# Patient Record
Sex: MALE | Race: WHITE | NOT HISPANIC OR LATINO | Employment: OTHER | URBAN - NONMETROPOLITAN AREA
[De-identification: names, ages, dates, MRNs, and addresses within clinical notes are randomized per-mention and may not be internally consistent; named-entity substitution may affect disease eponyms.]

---

## 2018-01-12 ENCOUNTER — APPOINTMENT (OUTPATIENT)
Dept: ULTRASOUND IMAGING | Facility: HOSPITAL | Age: 38
End: 2018-01-12

## 2018-01-12 ENCOUNTER — APPOINTMENT (OUTPATIENT)
Dept: GENERAL RADIOLOGY | Facility: HOSPITAL | Age: 38
End: 2018-01-12

## 2018-01-12 ENCOUNTER — HOSPITAL ENCOUNTER (INPATIENT)
Facility: HOSPITAL | Age: 38
LOS: 2 days | Discharge: HOME | End: 2018-01-16
Attending: EMERGENCY MEDICINE | Admitting: FAMILY MEDICINE

## 2018-01-12 DIAGNOSIS — K70.31 ASCITES DUE TO ALCOHOLIC CIRRHOSIS (HCC): Primary | ICD-10-CM

## 2018-01-12 DIAGNOSIS — R26.89 IMPAIRED GAIT AND MOBILITY: ICD-10-CM

## 2018-01-12 DIAGNOSIS — R06.02 SHORTNESS OF BREATH: ICD-10-CM

## 2018-01-12 DIAGNOSIS — R26.9 IMPAIRED GAIT: ICD-10-CM

## 2018-01-12 LAB
ALBUMIN FLD-MCNC: 1.2 G/DL
ALBUMIN SERPL-MCNC: 3.1 G/DL (ref 3.4–4.8)
ALBUMIN/GLOB SERPL: 1.1 G/DL (ref 1.1–1.8)
ALP SERPL-CCNC: 91 U/L (ref 38–126)
ALT SERPL W P-5'-P-CCNC: 26 U/L (ref 21–72)
AMMONIA BLD-SCNC: <9 UMOL/L (ref 9–30)
ANION GAP SERPL CALCULATED.3IONS-SCNC: 12 MMOL/L (ref 5–15)
ANION GAP SERPL CALCULATED.3IONS-SCNC: 5 MMOL/L (ref 5–15)
APTT PPP: 38.3 SECONDS (ref 20–40.3)
AST SERPL-CCNC: 30 U/L (ref 17–59)
BASOPHILS # BLD AUTO: 0.02 10*3/MM3 (ref 0–0.2)
BASOPHILS # BLD AUTO: 0.02 10*3/MM3 (ref 0–0.2)
BASOPHILS NFR BLD AUTO: 0.6 % (ref 0–2)
BASOPHILS NFR BLD AUTO: 0.6 % (ref 0–2)
BILIRUB SERPL-MCNC: 0.8 MG/DL (ref 0.2–1.3)
BILIRUB UR QL STRIP: NEGATIVE
BUN BLD-MCNC: 8 MG/DL (ref 7–21)
BUN BLD-MCNC: 8 MG/DL (ref 7–21)
BUN/CREAT SERPL: 8.6 (ref 7–25)
BUN/CREAT SERPL: 8.7 (ref 7–25)
CALCIUM SPEC-SCNC: 8 MG/DL (ref 8.4–10.2)
CALCIUM SPEC-SCNC: 8.5 MG/DL (ref 8.4–10.2)
CHLORIDE SERPL-SCNC: 104 MMOL/L (ref 95–110)
CHLORIDE SERPL-SCNC: 106 MMOL/L (ref 95–110)
CLARITY UR: CLEAR
CO2 SERPL-SCNC: 21 MMOL/L (ref 22–31)
CO2 SERPL-SCNC: 25 MMOL/L (ref 22–31)
COLOR UR: YELLOW
CREAT BLD-MCNC: 0.92 MG/DL (ref 0.7–1.3)
CREAT BLD-MCNC: 0.93 MG/DL (ref 0.7–1.3)
DEPRECATED RDW RBC AUTO: 48.1 FL (ref 35.1–43.9)
DEPRECATED RDW RBC AUTO: 48.4 FL (ref 35.1–43.9)
EOSINOPHIL # BLD AUTO: 0.03 10*3/MM3 (ref 0–0.7)
EOSINOPHIL # BLD AUTO: 0.04 10*3/MM3 (ref 0–0.7)
EOSINOPHIL NFR BLD AUTO: 0.8 % (ref 0–7)
EOSINOPHIL NFR BLD AUTO: 1.1 % (ref 0–7)
ERYTHROCYTE [DISTWIDTH] IN BLOOD BY AUTOMATED COUNT: 14.6 % (ref 11.5–14.5)
ERYTHROCYTE [DISTWIDTH] IN BLOOD BY AUTOMATED COUNT: 14.7 % (ref 11.5–14.5)
GFR SERPL CREATININE-BSD FRML MDRD: 91 ML/MIN/1.73 (ref 70–162)
GFR SERPL CREATININE-BSD FRML MDRD: 93 ML/MIN/1.73 (ref 60–162)
GLOBULIN UR ELPH-MCNC: 2.9 GM/DL (ref 2.3–3.5)
GLUCOSE BLD-MCNC: 86 MG/DL (ref 60–100)
GLUCOSE BLD-MCNC: 88 MG/DL (ref 60–100)
GLUCOSE FLD-MCNC: 97 MG/DL
GLUCOSE UR STRIP-MCNC: NEGATIVE MG/DL
HCT VFR BLD AUTO: 28.1 % (ref 39–49)
HCT VFR BLD AUTO: 28.6 % (ref 39–49)
HGB BLD-MCNC: 9.8 G/DL (ref 13.7–17.3)
HGB BLD-MCNC: 9.9 G/DL (ref 13.7–17.3)
HGB UR QL STRIP.AUTO: NEGATIVE
HOLD SPECIMEN: NORMAL
IMM GRANULOCYTES # BLD: 0 10*3/MM3 (ref 0–0.02)
IMM GRANULOCYTES # BLD: 0.01 10*3/MM3 (ref 0–0.02)
IMM GRANULOCYTES NFR BLD: 0 % (ref 0–0.5)
IMM GRANULOCYTES NFR BLD: 0.3 % (ref 0–0.5)
INR PPP: 1.3 (ref 0.8–1.2)
KETONES UR QL STRIP: NEGATIVE
LEUKOCYTE ESTERASE UR QL STRIP.AUTO: NEGATIVE
LIPASE SERPL-CCNC: <10 U/L (ref 23–300)
LYMPHOCYTES # BLD AUTO: 1 10*3/MM3 (ref 0.6–4.2)
LYMPHOCYTES # BLD AUTO: 1.4 10*3/MM3 (ref 0.6–4.2)
LYMPHOCYTES NFR BLD AUTO: 27.7 % (ref 10–50)
LYMPHOCYTES NFR BLD AUTO: 39.9 % (ref 10–50)
MCH RBC QN AUTO: 30.9 PG (ref 26.5–34)
MCH RBC QN AUTO: 31.4 PG (ref 26.5–34)
MCHC RBC AUTO-ENTMCNC: 34.6 G/DL (ref 31.5–36.3)
MCHC RBC AUTO-ENTMCNC: 34.9 G/DL (ref 31.5–36.3)
MCV RBC AUTO: 89.4 FL (ref 80–98)
MCV RBC AUTO: 90.1 FL (ref 80–98)
MONOCYTES # BLD AUTO: 0.3 10*3/MM3 (ref 0–0.9)
MONOCYTES # BLD AUTO: 0.32 10*3/MM3 (ref 0–0.9)
MONOCYTES NFR BLD AUTO: 8.3 % (ref 0–12)
MONOCYTES NFR BLD AUTO: 9.1 % (ref 0–12)
NEUTROPHILS # BLD AUTO: 1.73 10*3/MM3 (ref 2–8.6)
NEUTROPHILS # BLD AUTO: 2.25 10*3/MM3 (ref 2–8.6)
NEUTROPHILS NFR BLD AUTO: 49.3 % (ref 37–80)
NEUTROPHILS NFR BLD AUTO: 62.3 % (ref 37–80)
NITRITE UR QL STRIP: NEGATIVE
PH UR STRIP.AUTO: 6 [PH] (ref 5–9)
PLATELET # BLD AUTO: 126 10*3/MM3 (ref 150–450)
PLATELET # BLD AUTO: 127 10*3/MM3 (ref 150–450)
PMV BLD AUTO: 10.1 FL (ref 8–12)
PMV BLD AUTO: 9.7 FL (ref 8–12)
POTASSIUM BLD-SCNC: 3.4 MMOL/L (ref 3.5–5.1)
POTASSIUM BLD-SCNC: 3.9 MMOL/L (ref 3.5–5.1)
PROT SERPL-MCNC: 6 G/DL (ref 6.3–8.6)
PROT UR QL STRIP: NEGATIVE
PROTHROMBIN TIME: 16.2 SECONDS (ref 11.1–15.3)
RBC # BLD AUTO: 3.12 10*6/MM3 (ref 4.37–5.74)
RBC # BLD AUTO: 3.2 10*6/MM3 (ref 4.37–5.74)
SODIUM BLD-SCNC: 136 MMOL/L (ref 137–145)
SODIUM BLD-SCNC: 137 MMOL/L (ref 137–145)
SP GR UR STRIP: 1.02 (ref 1–1.03)
UROBILINOGEN UR QL STRIP: NORMAL
WBC NRBC COR # BLD: 3.51 10*3/MM3 (ref 3.2–9.8)
WBC NRBC COR # BLD: 3.61 10*3/MM3 (ref 3.2–9.8)

## 2018-01-12 PROCEDURE — 76942 ECHO GUIDE FOR BIOPSY: CPT

## 2018-01-12 PROCEDURE — P9041 ALBUMIN (HUMAN),5%, 50ML: HCPCS | Performed by: FAMILY MEDICINE

## 2018-01-12 PROCEDURE — 82945 GLUCOSE OTHER FLUID: CPT | Performed by: HOSPITALIST

## 2018-01-12 PROCEDURE — 25010000002 FUROSEMIDE PER 20 MG: Performed by: FAMILY MEDICINE

## 2018-01-12 PROCEDURE — 88112 CYTOPATH CELL ENHANCE TECH: CPT | Performed by: PATHOLOGY

## 2018-01-12 PROCEDURE — 25010000002 ONDANSETRON PER 1 MG: Performed by: FAMILY MEDICINE

## 2018-01-12 PROCEDURE — G0378 HOSPITAL OBSERVATION PER HR: HCPCS

## 2018-01-12 PROCEDURE — 82042 OTHER SOURCE ALBUMIN QUAN EA: CPT | Performed by: HOSPITALIST

## 2018-01-12 PROCEDURE — 88104 CYTOPATH FL NONGYN SMEARS: CPT | Performed by: PATHOLOGY

## 2018-01-12 PROCEDURE — 81003 URINALYSIS AUTO W/O SCOPE: CPT | Performed by: EMERGENCY MEDICINE

## 2018-01-12 PROCEDURE — 99284 EMERGENCY DEPT VISIT MOD MDM: CPT

## 2018-01-12 PROCEDURE — 85730 THROMBOPLASTIN TIME PARTIAL: CPT | Performed by: EMERGENCY MEDICINE

## 2018-01-12 PROCEDURE — 85025 COMPLETE CBC W/AUTO DIFF WBC: CPT | Performed by: EMERGENCY MEDICINE

## 2018-01-12 PROCEDURE — 85025 COMPLETE CBC W/AUTO DIFF WBC: CPT | Performed by: HOSPITALIST

## 2018-01-12 PROCEDURE — 83690 ASSAY OF LIPASE: CPT | Performed by: EMERGENCY MEDICINE

## 2018-01-12 PROCEDURE — 88112 CYTOPATH CELL ENHANCE TECH: CPT | Performed by: HOSPITALIST

## 2018-01-12 PROCEDURE — 88104 CYTOPATH FL NONGYN SMEARS: CPT | Performed by: HOSPITALIST

## 2018-01-12 PROCEDURE — 87070 CULTURE OTHR SPECIMN AEROBIC: CPT | Performed by: HOSPITALIST

## 2018-01-12 PROCEDURE — 25010000002 HYDROMORPHONE PER 4 MG: Performed by: HOSPITALIST

## 2018-01-12 PROCEDURE — 25010000002 LORAZEPAM PER 2 MG: Performed by: EMERGENCY MEDICINE

## 2018-01-12 PROCEDURE — 80053 COMPREHEN METABOLIC PANEL: CPT | Performed by: EMERGENCY MEDICINE

## 2018-01-12 PROCEDURE — 71045 X-RAY EXAM CHEST 1 VIEW: CPT

## 2018-01-12 PROCEDURE — 0W9G3ZX DRAINAGE OF PERITONEAL CAVITY, PERCUTANEOUS APPROACH, DIAGNOSTIC: ICD-10-PCS | Performed by: RADIOLOGY

## 2018-01-12 PROCEDURE — 82140 ASSAY OF AMMONIA: CPT | Performed by: EMERGENCY MEDICINE

## 2018-01-12 PROCEDURE — 87015 SPECIMEN INFECT AGNT CONCNTJ: CPT | Performed by: HOSPITALIST

## 2018-01-12 PROCEDURE — 94799 UNLISTED PULMONARY SVC/PX: CPT

## 2018-01-12 PROCEDURE — 25010000002 ALBUMIN HUMAN 5% PER 50 ML: Performed by: FAMILY MEDICINE

## 2018-01-12 PROCEDURE — 87205 SMEAR GRAM STAIN: CPT | Performed by: HOSPITALIST

## 2018-01-12 PROCEDURE — 85610 PROTHROMBIN TIME: CPT | Performed by: EMERGENCY MEDICINE

## 2018-01-12 PROCEDURE — 25010000002 MORPHINE PER 10 MG: Performed by: EMERGENCY MEDICINE

## 2018-01-12 RX ORDER — ALBUMIN, HUMAN INJ 5% 5 %
25 SOLUTION INTRAVENOUS ONCE
Status: COMPLETED | OUTPATIENT
Start: 2018-01-12 | End: 2018-01-12

## 2018-01-12 RX ORDER — LACTULOSE 10 G/15ML
20 SOLUTION ORAL 2 TIMES DAILY
Status: DISCONTINUED | OUTPATIENT
Start: 2018-01-12 | End: 2018-01-16 | Stop reason: HOSPADM

## 2018-01-12 RX ORDER — ONDANSETRON 2 MG/ML
4 INJECTION INTRAMUSCULAR; INTRAVENOUS EVERY 4 HOURS
Status: DISCONTINUED | OUTPATIENT
Start: 2018-01-12 | End: 2018-01-16 | Stop reason: HOSPADM

## 2018-01-12 RX ORDER — QUETIAPINE FUMARATE 100 MG/1
200 TABLET, FILM COATED ORAL EVERY 12 HOURS SCHEDULED
Status: DISCONTINUED | OUTPATIENT
Start: 2018-01-12 | End: 2018-01-16 | Stop reason: HOSPADM

## 2018-01-12 RX ORDER — LACTULOSE 10 G/15ML
20 SOLUTION ORAL 2 TIMES DAILY
COMMUNITY
Start: 2017-12-11

## 2018-01-12 RX ORDER — NICOTINE 21 MG/24HR
1 PATCH, TRANSDERMAL 24 HOURS TRANSDERMAL EVERY 24 HOURS
Status: DISCONTINUED | OUTPATIENT
Start: 2018-01-12 | End: 2018-01-16 | Stop reason: HOSPADM

## 2018-01-12 RX ORDER — SPIRONOLACTONE 25 MG/1
25 TABLET ORAL DAILY
Status: DISCONTINUED | OUTPATIENT
Start: 2018-01-12 | End: 2018-01-16 | Stop reason: HOSPADM

## 2018-01-12 RX ORDER — LORAZEPAM 2 MG/ML
1 INJECTION INTRAMUSCULAR ONCE
Status: COMPLETED | OUTPATIENT
Start: 2018-01-12 | End: 2018-01-12

## 2018-01-12 RX ORDER — FUROSEMIDE 10 MG/ML
20 INJECTION INTRAMUSCULAR; INTRAVENOUS EVERY 12 HOURS
Status: DISCONTINUED | OUTPATIENT
Start: 2018-01-12 | End: 2018-01-14

## 2018-01-12 RX ORDER — NADOLOL 20 MG/1
20 TABLET ORAL
Status: DISCONTINUED | OUTPATIENT
Start: 2018-01-12 | End: 2018-01-16 | Stop reason: HOSPADM

## 2018-01-12 RX ORDER — SODIUM CHLORIDE 0.9 % (FLUSH) 0.9 %
10 SYRINGE (ML) INJECTION AS NEEDED
Status: DISCONTINUED | OUTPATIENT
Start: 2018-01-12 | End: 2018-01-16 | Stop reason: HOSPADM

## 2018-01-12 RX ORDER — SODIUM CHLORIDE 0.9 % (FLUSH) 0.9 %
1-10 SYRINGE (ML) INJECTION AS NEEDED
Status: DISCONTINUED | OUTPATIENT
Start: 2018-01-12 | End: 2018-01-16 | Stop reason: HOSPADM

## 2018-01-12 RX ORDER — NALOXONE HCL 0.4 MG/ML
0.4 VIAL (ML) INJECTION
Status: DISCONTINUED | OUTPATIENT
Start: 2018-01-12 | End: 2018-01-13

## 2018-01-12 RX ORDER — QUETIAPINE 400 MG/1
400 TABLET, FILM COATED, EXTENDED RELEASE ORAL NIGHTLY
COMMUNITY
End: 2018-02-20 | Stop reason: ALTCHOICE

## 2018-01-12 RX ADMIN — LORAZEPAM 1 MG: 2 INJECTION INTRAMUSCULAR; INTRAVENOUS at 01:39

## 2018-01-12 RX ADMIN — HYDROMORPHONE HYDROCHLORIDE 1 MG: 10 INJECTION, SOLUTION INTRAMUSCULAR; INTRAVENOUS; SUBCUTANEOUS at 05:20

## 2018-01-12 RX ADMIN — HYDROMORPHONE HYDROCHLORIDE 1 MG: 10 INJECTION, SOLUTION INTRAMUSCULAR; INTRAVENOUS; SUBCUTANEOUS at 17:13

## 2018-01-12 RX ADMIN — LACTULOSE 20 G: 20 SOLUTION ORAL at 20:21

## 2018-01-12 RX ADMIN — QUETIAPINE 200 MG: 100 TABLET ORAL at 20:21

## 2018-01-12 RX ADMIN — HYDROMORPHONE HYDROCHLORIDE 1 MG: 10 INJECTION, SOLUTION INTRAMUSCULAR; INTRAVENOUS; SUBCUTANEOUS at 21:35

## 2018-01-12 RX ADMIN — HYDROMORPHONE HYDROCHLORIDE 1 MG: 10 INJECTION, SOLUTION INTRAMUSCULAR; INTRAVENOUS; SUBCUTANEOUS at 09:27

## 2018-01-12 RX ADMIN — HYDROMORPHONE HYDROCHLORIDE 1 MG: 10 INJECTION, SOLUTION INTRAMUSCULAR; INTRAVENOUS; SUBCUTANEOUS at 13:09

## 2018-01-12 RX ADMIN — ALBUMIN HUMAN 25 G: 0.05 INJECTION, SOLUTION INTRAVENOUS at 13:09

## 2018-01-12 RX ADMIN — NICOTINE 1 PATCH: 21 PATCH TRANSDERMAL at 06:23

## 2018-01-12 RX ADMIN — ONDANSETRON 4 MG: 2 INJECTION INTRAMUSCULAR; INTRAVENOUS at 17:13

## 2018-01-12 RX ADMIN — NADOLOL 20 MG: 20 TABLET ORAL at 15:07

## 2018-01-12 RX ADMIN — FUROSEMIDE 20 MG: 10 INJECTION, SOLUTION INTRAMUSCULAR; INTRAVENOUS at 13:09

## 2018-01-12 RX ADMIN — MORPHINE SULFATE 4 MG: 4 INJECTION, SOLUTION INTRAMUSCULAR; INTRAVENOUS at 01:29

## 2018-01-12 RX ADMIN — ONDANSETRON 4 MG: 2 INJECTION INTRAMUSCULAR; INTRAVENOUS at 13:09

## 2018-01-12 RX ADMIN — HYDROMORPHONE HYDROCHLORIDE 1 MG: 10 INJECTION, SOLUTION INTRAMUSCULAR; INTRAVENOUS; SUBCUTANEOUS at 02:21

## 2018-01-12 RX ADMIN — SPIRONOLACTONE 25 MG: 25 TABLET ORAL at 15:07

## 2018-01-12 RX ADMIN — ONDANSETRON 4 MG: 2 INJECTION INTRAMUSCULAR; INTRAVENOUS at 21:30

## 2018-01-12 RX ADMIN — QUETIAPINE 200 MG: 100 TABLET ORAL at 09:27

## 2018-01-12 RX ADMIN — LACTULOSE 20 G: 20 SOLUTION ORAL at 11:14

## 2018-01-12 RX ADMIN — ONDANSETRON 4 MG: 2 INJECTION INTRAMUSCULAR; INTRAVENOUS at 09:27

## 2018-01-12 NOTE — PLAN OF CARE
Problem: Patient Care Overview (Adult)  Goal: Plan of Care Review  Outcome: Ongoing (interventions implemented as appropriate)   01/12/18 0430 01/12/18 0657   Patient Care Overview   Progress --  no change   Outcome Evaluation   Outcome Summary/Follow up Plan --  Patient complains of severe abdominal pain. PRN pain medications given and effective. Patient 's abdomen is extremely distended. Skin is very tight and abdominal veins are very visible. Patient has a difficult time changing position and sitting upright. Patient is currently NPO and scheduled for a paracentesis today. Will continue to monitor.    Coping/Psychosocial Response Interventions   Plan Of Care Reviewed With patient --      Goal: Adult Individualization and Mutuality  Outcome: Ongoing (interventions implemented as appropriate)    Goal: Discharge Needs Assessment  Outcome: Ongoing (interventions implemented as appropriate)      Problem: Pain, Acute (Adult)  Goal: Identify Related Risk Factors and Signs and Symptoms  Outcome: Ongoing (interventions implemented as appropriate)    Goal: Acceptable Pain Control/Comfort Level  Outcome: Ongoing (interventions implemented as appropriate)      Problem: Fluid Volume Excess (Adult,Obstetrics,Pediatric)  Goal: Identify Related Risk Factors and Signs and Symptoms  Outcome: Ongoing (interventions implemented as appropriate)    Goal: Stable Weight  Outcome: Ongoing (interventions implemented as appropriate)    Goal: Balanced Intake/Output  Outcome: Ongoing (interventions implemented as appropriate)

## 2018-01-12 NOTE — ED PROVIDER NOTES
Subjective   History of Present Illness  37-year-old male with history of alcoholic cirrhosis and ascites presents to the emergency department because of increasing abdominal distention with associated shortness of breath.  He reportedly had approximate 7 L of fluid drained from his abdominal cavity at the beginning of December.  Since then he has not followed up with GI reportedly is being scheduled appointment with GI specialist.  He's had increasing abdominal distention the point where he is having significant amount of shortness of breath prompted his visit to the emergency department tonight.  He has no fevers or chills.  His vomiting.  He reports normal urinary output.  He is a poor historian overall was not sure of his medications.  Review of Systems   Constitutional: Negative for chills, fatigue and fever.   HENT: Negative for congestion, nosebleeds, postnasal drip, sinus pressure and sore throat.    Eyes: Negative for pain.   Respiratory: Positive for shortness of breath. Negative for cough, chest tightness, wheezing and stridor.    Cardiovascular: Positive for leg swelling. Negative for chest pain and palpitations.   Gastrointestinal: Positive for abdominal distention. Negative for abdominal pain, constipation, diarrhea, nausea and vomiting.   Genitourinary: Negative for dysuria, flank pain, frequency, hematuria, testicular pain and urgency.   Musculoskeletal: Negative for arthralgias and myalgias.   Skin: Negative for rash.   Neurological: Negative for syncope, light-headedness and headaches.   Psychiatric/Behavioral: Negative.        Past Medical History:   Diagnosis Date   • Cirrhosis    • Renal disorder        No Known Allergies    Past Surgical History:   Procedure Laterality Date   • ORTHOPEDIC SURGERY Left     pt states titanium plates in L arm and R leg       History reviewed. No pertinent family history.    Social History     Social History   • Marital status: Single     Spouse name: N/A   •  Number of children: N/A   • Years of education: N/A     Social History Main Topics   • Smoking status: Current Every Day Smoker     Packs/day: 1.00     Types: Cigarettes   • Smokeless tobacco: Never Used   • Alcohol use No   • Drug use: No   • Sexual activity: Defer     Other Topics Concern   • None     Social History Narrative   • None           Objective   Physical Exam   Constitutional: He is oriented to person, place, and time. He appears well-developed and well-nourished.   HENT:   Head: Normocephalic and atraumatic.   Eyes: Conjunctivae and EOM are normal. Pupils are equal, round, and reactive to light.   Neck: Normal range of motion. Neck supple.   Cardiovascular: Normal rate, regular rhythm and normal heart sounds.    Pulmonary/Chest: Effort normal.   Mild tachypnea   Abdominal: Soft.   Tense ascites without focal tenderness.  Abdominal wall varices   Musculoskeletal: Normal range of motion.   Neurological: He is alert and oriented to person, place, and time.   Skin: Skin is warm and dry.   Psychiatric: He has a normal mood and affect.   Nursing note and vitals reviewed.      Procedures         ED Course  ED Course      XR Chest 1 View   Final Result   Atelectasis and/or infiltrate in the right lower   lung. Recommend follow-up..      Electronically signed by:  Kobe Easley MD  1/12/2018 2:13 AM   CST Workstation: [facility] Paracentesis    (Results Pending)     Labs Reviewed   COMPREHENSIVE METABOLIC PANEL - Abnormal; Notable for the following:        Result Value    Potassium 3.4 (*)     CO2 21.0 (*)     Calcium 8.0 (*)     Total Protein 6.0 (*)     Albumin 3.10 (*)     All other components within normal limits   PROTIME-INR - Abnormal; Notable for the following:     Protime 16.2 (*)     INR 1.30 (*)     All other components within normal limits    Narrative:     Therapeutic range for most indications is 2.0-3.0 INR,  or 2.5-3.5 for mechanical heart valves.   LIPASE - Abnormal; Notable for  the following:     Lipase <10 (*)     All other components within normal limits   CBC WITH AUTO DIFFERENTIAL - Abnormal; Notable for the following:     RBC 3.20 (*)     Hemoglobin 9.9 (*)     Hematocrit 28.6 (*)     RDW 14.6 (*)     RDW-SD 48.1 (*)     Platelets 127 (*)     All other components within normal limits   AMMONIA - Abnormal; Notable for the following:     Ammonia <9 (*)     All other components within normal limits   APTT - Normal    Narrative:     The recommended Heparin therapeutic range is 68-97 seconds.   URINALYSIS W/ CULTURE IF INDICATED - Normal    Narrative:     Urine microscopic not indicated.   BASIC METABOLIC PANEL   BASIC METABOLIC PANEL   CBC WITH AUTO DIFFERENTIAL   CBC WITH AUTO DIFFERENTIAL   CBC AND DIFFERENTIAL    Narrative:     The following orders were created for panel order CBC & Differential.  Procedure                               Abnormality         Status                     ---------                               -----------         ------                     CBC Auto Differential[[id number]]        Abnormal            Final result                 Please view results for these tests on the individual orders.   EXTRA TUBES    Narrative:     The following orders were created for panel order Extra Tubes.  Procedure                               Abnormality         Status                     ---------                               -----------         ------                     Gold Top - SST[[id number]]                                   Final result                 Please view results for these tests on the individual orders.   GOLD TOP - SST   CBC AND DIFFERENTIAL    Narrative:     The following orders were created for panel order CBC & Differential.  Procedure                               Abnormality         Status                     ---------                               -----------         ------                     CBC Auto Differential[[id number]]                                                          Please view results for these tests on the individual orders.   CBC AND DIFFERENTIAL    Narrative:     The following orders were created for panel order CBC & Differential.  Procedure                               Abnormality         Status                     ---------                               -----------         ------                     CBC Auto Differential[[id number]]                                                         Please view results for these tests on the individual orders.                 MDM  Number of Diagnoses or Management Options  Diagnosis management comments: I expect this patient likely be admitted because this tends ascites.  He has no fevers and I do not believe the patient has SBP at this time.  We'll check his renal function and some of the lab work.  As stated likely admit this patient.      Final diagnoses:   Ascites due to alcoholic cirrhosis   Shortness of breath            Clovis Carr MD  01/12/18 6203

## 2018-01-12 NOTE — H&P
[facility] Medicine Admission      Date of Admission: 1/12/2018      Primary Care Physician: Walt Costello Jr, MD      Chief Complaint:  Abdominal pain    HPI:  Patient has a history of cirrhosis that was diagnosed in May of 2017.  He was treated at [facility] and required transfer to [place] for higher level of care.  While in [place], he underwent hemodialysis for hepatorenal syndrome.  He improved and went to SNF for rehab.  He went home from SNF in November.  He states that on December 18 he had paracentesis with 7 liters of fluid removed.  He feels that all of the fluid has reaccumulated.  He also feels swelling in his legs.  He is currently having significant pain in his abdomen.  He is somewhat short of breath.  He states that he is completely sober, and hasn't had any alcohol in over a year.  He states that he didn't feel like all of the fluid was removed initially.  There are no alleviating or exacerbating factors.                                                                           Concurrent Medical History:  has a past medical history of Cirrhosis and Renal disorder.    Past Surgical History:  has a past surgical history that includes orthopedic surgery (Left).    Family History: Mom had an MI, Dad has COPD and HTN.    Social History:  reports that he has been smoking Cigarettes.  He has been smoking about 1.00 pack per day. He has never used smokeless tobacco. He reports that he does not drink alcohol or use illicit drugs.    Allergies: No Known Allergies    Medications:   Prescriptions Prior to Admission   Medication Sig Dispense Refill Last Dose   • lactulose (CHRONULAC) 10 GM/15ML solution Take 20 g by mouth 2 (Two) Times a Day.      • QUEtiapine XR (SEROquel XR) 400 MG 24 hr tablet Take 400 mg by mouth Every Night.          Review of Systems:  Review of Systems   Constitutional: Positive for activity change, appetite change and fever.  Negative for chills, fatigue and unexpected weight change.   HENT: Negative for congestion, facial swelling, hearing loss, nosebleeds, rhinorrhea, sneezing, trouble swallowing and voice change.    Eyes: Negative for photophobia and visual disturbance.   Respiratory: Positive for shortness of breath. Negative for apnea, cough, choking, chest tightness, wheezing and stridor.    Cardiovascular: Positive for leg swelling. Negative for chest pain and palpitations.   Gastrointestinal: Positive for abdominal distention, abdominal pain and constipation. Negative for blood in stool, diarrhea, nausea and vomiting.   Endocrine: Negative for cold intolerance, heat intolerance, polydipsia, polyphagia and polyuria.   Genitourinary: Negative for dysuria, flank pain and hematuria.   Musculoskeletal: Negative for arthralgias, back pain, myalgias and neck pain.   Skin: Negative for rash and wound.   Allergic/Immunologic: Negative for immunocompromised state.   Neurological: Negative for dizziness, seizures, syncope, speech difficulty, weakness, light-headedness, numbness and headaches.   Hematological: Does not bruise/bleed easily.   Psychiatric/Behavioral: Negative for agitation, behavioral problems, confusion, decreased concentration, hallucinations, self-injury and suicidal ideas. The patient is not nervous/anxious.       Otherwise complete ROS is negative except as mentioned above.    Physical Exam:   Temp:  [98.6 °F (37 °C)-99.2 °F (37.3 °C)] 98.8 °F (37.1 °C)  Heart Rate:  [] 87  Resp:  [18-20] 18  BP: (124-146)/(70-97) 124/70     Physical Exam   Constitutional: He is oriented to person, place, and time. He appears well-developed and well-nourished.   HENT:   Head: Normocephalic and atraumatic.   Nose: Nose normal.   Mouth/Throat: Oropharynx is clear and moist.   Eyes: Conjunctivae, EOM and lids are normal. Pupils are equal, round, and reactive to light. No scleral icterus.   Neck: Normal range of motion. Neck supple. No  JVD present. No tracheal tenderness and no spinous process tenderness present. No rigidity. No tracheal deviation, no edema and normal range of motion present.   Cardiovascular: Normal rate, regular rhythm, S1 normal, S2 normal, normal heart sounds and normal pulses.  Exam reveals no gallop and no friction rub.    No murmur heard.  Pulmonary/Chest: Effort normal and breath sounds normal. No accessory muscle usage. No respiratory distress. He has no decreased breath sounds. He has no wheezes. He has no rales. He exhibits no tenderness.   Abdominal: Soft. He exhibits distension (massive), fluid wave and ascites. He exhibits no mass. Bowel sounds are decreased. There is generalized tenderness. There is no rebound and no guarding.   Musculoskeletal: He exhibits edema. He exhibits no tenderness.   Neurological: He is alert and oriented to person, place, and time. He has normal reflexes. He displays no atrophy and normal reflexes. No cranial nerve deficit or sensory deficit. He exhibits normal muscle tone. He displays no seizure activity. Coordination normal.   Skin: Skin is warm. No rash noted. No pallor.   Psychiatric: He has a normal mood and affect. His behavior is normal. Judgment and thought content normal.         Results Reviewed:  I have personally reviewed current lab, radiology, and data and agree with results.  Lab Results (last 24 hours)     Procedure Component Value Units Date/Time    CBC & Differential [[id number]] Collected:  01/12/18 0129    Specimen:  Blood Updated:  01/12/18 0139    Narrative:       The following orders were created for panel order CBC & Differential.  Procedure                               Abnormality         Status                     ---------                               -----------         ------                     CBC Auto Differential[[id number]]        Abnormal            Final result                 Please view results for these tests on the individual orders.    CBC Auto  Differential [[id number]]  (Abnormal) Collected:  01/12/18 0129    Specimen:  Blood Updated:  01/12/18 0139     WBC 3.61 10*3/mm3      RBC 3.20 (L) 10*6/mm3      Hemoglobin 9.9 (L) g/dL      Hematocrit 28.6 (L) %      MCV 89.4 fL      MCH 30.9 pg      MCHC 34.6 g/dL      RDW 14.6 (H) %      RDW-SD 48.1 (H) fl      MPV 9.7 fL      Platelets 127 (L) 10*3/mm3      Neutrophil % 62.3 %      Lymphocyte % 27.7 %      Monocyte % 8.3 %      Eosinophil % 0.8 %      Basophil % 0.6 %      Immature Grans % 0.3 %      Neutrophils, Absolute 2.25 10*3/mm3      Lymphocytes, Absolute 1.00 10*3/mm3      Monocytes, Absolute 0.30 10*3/mm3      Eosinophils, Absolute 0.03 10*3/mm3      Basophils, Absolute 0.02 10*3/mm3      Immature Grans, Absolute 0.01 10*3/mm3     Urinalysis With / Culture If Indicated - Urine, Clean Catch [[id number]]  (Normal) Collected:  01/12/18 0140    Specimen:  Urine from Urine, Clean Catch Updated:  01/12/18 0153     Color, UA Yellow     Appearance, UA Clear     pH, UA 6.0     Specific Gravity, UA 1.017     Glucose, UA Negative     Ketones, UA Negative     Bilirubin, UA Negative     Blood, UA Negative     Protein, UA Negative     Leuk Esterase, UA Negative     Nitrite, UA Negative     Urobilinogen, UA 1.0 E.U./dL    Narrative:       Urine microscopic not indicated.    Comprehensive Metabolic Panel [[id number]]  (Abnormal) Collected:  01/12/18 0129    Specimen:  Blood Updated:  01/12/18 0155     Glucose 86 mg/dL      BUN 8 mg/dL      Creatinine 0.92 mg/dL      Sodium 137 mmol/L      Potassium 3.4 (L) mmol/L      Chloride 104 mmol/L      CO2 21.0 (L) mmol/L      Calcium 8.0 (L) mg/dL      Total Protein 6.0 (L) g/dL      Albumin 3.10 (L) g/dL      ALT (SGPT) 26 U/L      AST (SGOT) 30 U/L      Alkaline Phosphatase 91 U/L      Total Bilirubin 0.8 mg/dL      eGFR Non African Amer 93 mL/min/1.73      Globulin 2.9 gm/dL      A/G Ratio 1.1 g/dL      BUN/Creatinine Ratio 8.7     Anion Gap 12.0 mmol/L     Protime-INR  [[id number]]  (Abnormal) Collected:  01/12/18 0129    Specimen:  Blood Updated:  01/12/18 0156     Protime 16.2 (H) Seconds      INR 1.30 (H)    Narrative:       Therapeutic range for most indications is 2.0-3.0 INR,  or 2.5-3.5 for mechanical heart valves.    aPTT [[id number]]  (Normal) Collected:  01/12/18 0129    Specimen:  Blood Updated:  01/12/18 0157     PTT 38.3 seconds     Narrative:       The recommended Heparin therapeutic range is 68-97 seconds.    Lipase [[id number]]  (Abnormal) Collected:  01/12/18 0129    Specimen:  Blood Updated:  01/12/18 0213     Lipase <10 (L) U/L     Ammonia [[id number]]  (Abnormal) Collected:  01/12/18 0216    Specimen:  Blood Updated:  01/12/18 0230     Ammonia <9 (L) umol/L     Extra Tubes [[id number]] Collected:  01/12/18 0129    Specimen:  Blood from Blood, Venous Line Updated:  01/12/18 0231    Narrative:       The following orders were created for panel order Extra Tubes.  Procedure                               Abnormality         Status                     ---------                               -----------         ------                     Gold Top - SST[[id number]]                                   Final result                 Please view results for these tests on the individual orders.    Gold Top - SST [[id number]] Collected:  01/12/18 0129    Specimen:  Blood Updated:  01/12/18 0231     Extra Tube Hold for add-ons.      Auto resulted.       CBC & Differential [[id number]] Collected:  01/12/18 0700    Specimen:  Blood Updated:  01/12/18 0734    Narrative:       The following orders were created for panel order CBC & Differential.  Procedure                               Abnormality         Status                     ---------                               -----------         ------                     CBC Auto Differential[[id number]]        Abnormal            Final result                 Please view results for these tests on the individual orders.    CBC Auto Differential  [[id number]]  (Abnormal) Collected:  01/12/18 0700    Specimen:  Blood Updated:  01/12/18 0734     WBC 3.51 10*3/mm3      RBC 3.12 (L) 10*6/mm3      Hemoglobin 9.8 (L) g/dL      Hematocrit 28.1 (L) %      MCV 90.1 fL      MCH 31.4 pg      MCHC 34.9 g/dL      RDW 14.7 (H) %      RDW-SD 48.4 (H) fl      MPV 10.1 fL      Platelets 126 (L) 10*3/mm3      Neutrophil % 49.3 %      Lymphocyte % 39.9 %      Monocyte % 9.1 %      Eosinophil % 1.1 %      Basophil % 0.6 %      Immature Grans % 0.0 %      Neutrophils, Absolute 1.73 (L) 10*3/mm3      Lymphocytes, Absolute 1.40 10*3/mm3      Monocytes, Absolute 0.32 10*3/mm3      Eosinophils, Absolute 0.04 10*3/mm3      Basophils, Absolute 0.02 10*3/mm3      Immature Grans, Absolute 0.00 10*3/mm3     Basic Metabolic Panel [[id number]]  (Abnormal) Collected:  01/12/18 0700    Specimen:  Blood Updated:  01/12/18 0739     Glucose 88 mg/dL      BUN 8 mg/dL      Creatinine 0.93 mg/dL      Sodium 136 (L) mmol/L      Potassium 3.9 mmol/L      Chloride 106 mmol/L      CO2 25.0 mmol/L      Calcium 8.5 mg/dL      eGFR Non African Amer 91 mL/min/1.73      BUN/Creatinine Ratio 8.6     Anion Gap 5.0 mmol/L         Imaging Results (last 24 hours)     Procedure Component Value Units Date/Time    XR Chest 1 View [[id number]] Collected:  01/12/18 0149     Updated:  01/12/18 0214    Narrative:       Exam: AP portable chest    INDICATION: Shortness of breath    FINDINGS: AP portable chest. There is a fixation plate in the  left humerus. The lungs are hypoinflated which accentuates heart  size and bronchovascular markings. Heart size normal. There is  atelectasis and/or infiltrate in the right lower lung. Mild  atelectasis in left lower lung.      Impression:       Atelectasis and/or infiltrate in the right lower  lung. Recommend follow-up..    Electronically signed by:  Kobe Easley MD  1/12/2018 2:13 AM  CST Workstation: [id number]            Assessment:    Hospital Problem List     Ascites due  to alcoholic cirrhosis                Plan:  1.  Massive ascites:  Therapeutic paracentesis.  Will check labs on fluid, but no stigmata of infection.  2.  Cirrhosis:  It seems he is only on lactulose at home, and doesn't follow up with GI.  Medications need to be adjusted and he needs to follow with GI on discharge.  3.  Tobacco abuse:  Cessation counseling done.  4.  DVT Prophylaxis:  SCDs.    I discussed the patients findings and my recommendations with: patient        This document has been electronically signed by Aamir Dooley MD on January 12, 2018 9:34 AM

## 2018-01-12 NOTE — PROGRESS NOTES
Patient seen and examined today. Patient feels slightly better since admission earlier today and after the paracentesis    Physical exam:    Temp:  [98.6 °F (37 °C)-99.2 °F (37.3 °C)] 98.8 °F (37.1 °C)  Heart Rate:  [] 87  Resp:  [18-20] 18  BP: (124-146)/(70-97) 124/70    GA: AAO x 3 ,NAD  CVS: S1S2 RRR  Pulm: CTAB , no wheezes rales or ronchi  Abd: firm and distended , non tender  Ext: No cyanosis clubbing . B/l edema     Ascites s/sp Therapeutic paracentesis. Alcoholic cirrhosis .  Advance diet .Continue current plan of care.add lasix, aldactone, and nadolol .repeat CXR

## 2018-01-12 NOTE — PRE-PROCEDURE NOTE
Patient's history and physical exam were reviewed, and no changes were noted.  The risks and benefits of the procedure were discussed with the patient, and the patient had ample opportunity to ask questions.  Informed consent was obtained.

## 2018-01-13 LAB
ALBUMIN SERPL-MCNC: 2.5 G/DL (ref 3.4–4.8)
ALBUMIN/GLOB SERPL: 1 G/DL (ref 1.1–1.8)
ALP SERPL-CCNC: 76 U/L (ref 38–126)
ALT SERPL W P-5'-P-CCNC: 26 U/L (ref 21–72)
ANION GAP SERPL CALCULATED.3IONS-SCNC: 7 MMOL/L (ref 5–15)
ANION GAP SERPL CALCULATED.3IONS-SCNC: 8 MMOL/L (ref 5–15)
AST SERPL-CCNC: 28 U/L (ref 17–59)
BASOPHILS # BLD AUTO: 0.02 10*3/MM3 (ref 0–0.2)
BASOPHILS NFR BLD AUTO: 0.6 % (ref 0–2)
BILIRUB CONJ SERPL-MCNC: 0 MG/DL (ref 0–0.3)
BILIRUB INDIRECT SERPL-MCNC: 0.2 MG/DL (ref 0–1.1)
BILIRUB SERPL-MCNC: 0.5 MG/DL (ref 0.2–1.3)
BILIRUB SERPL-MCNC: 0.5 MG/DL (ref 0.2–1.3)
BUN BLD-MCNC: 7 MG/DL (ref 7–21)
BUN BLD-MCNC: 8 MG/DL (ref 7–21)
BUN/CREAT SERPL: 5.7 (ref 7–25)
BUN/CREAT SERPL: 6.7 (ref 7–25)
CALCIUM SPEC-SCNC: 8 MG/DL (ref 8.4–10.2)
CALCIUM SPEC-SCNC: 8.1 MG/DL (ref 8.4–10.2)
CHLORIDE SERPL-SCNC: 104 MMOL/L (ref 95–110)
CHLORIDE SERPL-SCNC: 104 MMOL/L (ref 95–110)
CO2 SERPL-SCNC: 27 MMOL/L (ref 22–31)
CO2 SERPL-SCNC: 28 MMOL/L (ref 22–31)
CREAT BLD-MCNC: 1.19 MG/DL (ref 0.7–1.3)
CREAT BLD-MCNC: 1.22 MG/DL (ref 0.7–1.3)
DEPRECATED RDW RBC AUTO: 47.6 FL (ref 35.1–43.9)
EOSINOPHIL # BLD AUTO: 0.06 10*3/MM3 (ref 0–0.7)
EOSINOPHIL NFR BLD AUTO: 1.8 % (ref 0–7)
ERYTHROCYTE [DISTWIDTH] IN BLOOD BY AUTOMATED COUNT: 14.4 % (ref 11.5–14.5)
GFR SERPL CREATININE-BSD FRML MDRD: 67 ML/MIN/1.73 (ref 60–162)
GFR SERPL CREATININE-BSD FRML MDRD: 69 ML/MIN/1.73 (ref 60–162)
GLOBULIN UR ELPH-MCNC: 2.6 GM/DL (ref 2.3–3.5)
GLUCOSE BLD-MCNC: 107 MG/DL (ref 60–100)
GLUCOSE BLD-MCNC: 99 MG/DL (ref 60–100)
HCT VFR BLD AUTO: 31.6 % (ref 39–49)
HGB BLD-MCNC: 10.8 G/DL (ref 13.7–17.3)
IMM GRANULOCYTES # BLD: 0 10*3/MM3 (ref 0–0.02)
IMM GRANULOCYTES NFR BLD: 0 % (ref 0–0.5)
LYMPHOCYTES # BLD AUTO: 1.45 10*3/MM3 (ref 0.6–4.2)
LYMPHOCYTES NFR BLD AUTO: 43 % (ref 10–50)
MCH RBC QN AUTO: 30.8 PG (ref 26.5–34)
MCHC RBC AUTO-ENTMCNC: 34.2 G/DL (ref 31.5–36.3)
MCV RBC AUTO: 90 FL (ref 80–98)
MONOCYTES # BLD AUTO: 0.28 10*3/MM3 (ref 0–0.9)
MONOCYTES NFR BLD AUTO: 8.3 % (ref 0–12)
NEUTROPHILS # BLD AUTO: 1.56 10*3/MM3 (ref 2–8.6)
NEUTROPHILS NFR BLD AUTO: 46.3 % (ref 37–80)
PLATELET # BLD AUTO: 148 10*3/MM3 (ref 150–450)
PMV BLD AUTO: 10.9 FL (ref 8–12)
POTASSIUM BLD-SCNC: 3.8 MMOL/L (ref 3.5–5.1)
POTASSIUM BLD-SCNC: 3.9 MMOL/L (ref 3.5–5.1)
PROT SERPL-MCNC: 5.1 G/DL (ref 6.3–8.6)
RBC # BLD AUTO: 3.51 10*6/MM3 (ref 4.37–5.74)
SODIUM BLD-SCNC: 138 MMOL/L (ref 137–145)
SODIUM BLD-SCNC: 140 MMOL/L (ref 137–145)
WBC NRBC COR # BLD: 3.37 10*3/MM3 (ref 3.2–9.8)

## 2018-01-13 PROCEDURE — 82247 BILIRUBIN TOTAL: CPT | Performed by: FAMILY MEDICINE

## 2018-01-13 PROCEDURE — 80053 COMPREHEN METABOLIC PANEL: CPT | Performed by: HOSPITALIST

## 2018-01-13 PROCEDURE — 85025 COMPLETE CBC W/AUTO DIFF WBC: CPT | Performed by: HOSPITALIST

## 2018-01-13 PROCEDURE — 82248 BILIRUBIN DIRECT: CPT | Performed by: FAMILY MEDICINE

## 2018-01-13 PROCEDURE — 25010000002 ALBUMIN HUMAN 5% PER 50 ML: Performed by: FAMILY MEDICINE

## 2018-01-13 PROCEDURE — 25010000002 FUROSEMIDE PER 20 MG: Performed by: FAMILY MEDICINE

## 2018-01-13 PROCEDURE — 25010000002 ONDANSETRON PER 1 MG: Performed by: FAMILY MEDICINE

## 2018-01-13 PROCEDURE — P9041 ALBUMIN (HUMAN),5%, 50ML: HCPCS | Performed by: FAMILY MEDICINE

## 2018-01-13 PROCEDURE — G0378 HOSPITAL OBSERVATION PER HR: HCPCS

## 2018-01-13 RX ORDER — HYDROXYZINE PAMOATE 25 MG/1
25 CAPSULE ORAL ONCE
Status: COMPLETED | OUTPATIENT
Start: 2018-01-13 | End: 2018-01-13

## 2018-01-13 RX ORDER — ALBUMIN, HUMAN INJ 5% 5 %
25 SOLUTION INTRAVENOUS ONCE
Status: COMPLETED | OUTPATIENT
Start: 2018-01-13 | End: 2018-01-13

## 2018-01-13 RX ORDER — OXYCODONE HCL 10 MG/1
10 TABLET, FILM COATED, EXTENDED RELEASE ORAL EVERY 8 HOURS PRN
Status: DISCONTINUED | OUTPATIENT
Start: 2018-01-13 | End: 2018-01-16 | Stop reason: HOSPADM

## 2018-01-13 RX ORDER — FUROSEMIDE 10 MG/ML
20 INJECTION INTRAMUSCULAR; INTRAVENOUS ONCE
Status: COMPLETED | OUTPATIENT
Start: 2018-01-13 | End: 2018-01-13

## 2018-01-13 RX ORDER — OXYCODONE HCL 10 MG/1
10 TABLET, FILM COATED, EXTENDED RELEASE ORAL EVERY 12 HOURS SCHEDULED
Status: DISCONTINUED | OUTPATIENT
Start: 2018-01-13 | End: 2018-01-13

## 2018-01-13 RX ADMIN — ONDANSETRON 4 MG: 2 INJECTION INTRAMUSCULAR; INTRAVENOUS at 05:20

## 2018-01-13 RX ADMIN — NICOTINE 1 PATCH: 21 PATCH TRANSDERMAL at 05:21

## 2018-01-13 RX ADMIN — QUETIAPINE 200 MG: 100 TABLET ORAL at 21:20

## 2018-01-13 RX ADMIN — LACTULOSE 20 G: 20 SOLUTION ORAL at 08:17

## 2018-01-13 RX ADMIN — HYDROMORPHONE HYDROCHLORIDE 1 MG: 10 INJECTION, SOLUTION INTRAMUSCULAR; INTRAVENOUS; SUBCUTANEOUS at 10:24

## 2018-01-13 RX ADMIN — ONDANSETRON 4 MG: 2 INJECTION INTRAMUSCULAR; INTRAVENOUS at 01:30

## 2018-01-13 RX ADMIN — SPIRONOLACTONE 25 MG: 25 TABLET ORAL at 08:17

## 2018-01-13 RX ADMIN — ONDANSETRON 4 MG: 2 INJECTION INTRAMUSCULAR; INTRAVENOUS at 18:30

## 2018-01-13 RX ADMIN — ONDANSETRON 4 MG: 2 INJECTION INTRAMUSCULAR; INTRAVENOUS at 21:20

## 2018-01-13 RX ADMIN — ONDANSETRON 4 MG: 2 INJECTION INTRAMUSCULAR; INTRAVENOUS at 08:18

## 2018-01-13 RX ADMIN — FUROSEMIDE 20 MG: 10 INJECTION, SOLUTION INTRAMUSCULAR; INTRAVENOUS at 00:10

## 2018-01-13 RX ADMIN — HYDROMORPHONE HYDROCHLORIDE 1 MG: 10 INJECTION, SOLUTION INTRAMUSCULAR; INTRAVENOUS; SUBCUTANEOUS at 05:20

## 2018-01-13 RX ADMIN — QUETIAPINE 200 MG: 100 TABLET ORAL at 08:17

## 2018-01-13 RX ADMIN — FUROSEMIDE 20 MG: 10 INJECTION, SOLUTION INTRAMUSCULAR; INTRAVENOUS at 12:59

## 2018-01-13 RX ADMIN — OXYCODONE HYDROCHLORIDE 10 MG: 10 TABLET, FILM COATED, EXTENDED RELEASE ORAL at 18:30

## 2018-01-13 RX ADMIN — LACTULOSE 20 G: 20 SOLUTION ORAL at 21:20

## 2018-01-13 RX ADMIN — OXYCODONE HYDROCHLORIDE 10 MG: 10 TABLET, FILM COATED, EXTENDED RELEASE ORAL at 14:46

## 2018-01-13 RX ADMIN — NADOLOL 20 MG: 20 TABLET ORAL at 08:17

## 2018-01-13 RX ADMIN — HYDROXYZINE PAMOATE 25 MG: 25 CAPSULE ORAL at 00:30

## 2018-01-13 RX ADMIN — FUROSEMIDE 20 MG: 10 INJECTION, SOLUTION INTRAMUSCULAR; INTRAVENOUS at 13:01

## 2018-01-13 RX ADMIN — ONDANSETRON 4 MG: 2 INJECTION INTRAMUSCULAR; INTRAVENOUS at 13:01

## 2018-01-13 RX ADMIN — ALBUMIN HUMAN 25 G: 0.05 INJECTION, SOLUTION INTRAVENOUS at 19:18

## 2018-01-13 NOTE — PROGRESS NOTES
[facility] Medicine Services  INPATIENT PROGRESS NOTE    Length of Stay: 0  Date of Admission: 1/12/2018  Primary Care Physician: Walt Costello Jr, MD    Subjective     Chief Complaint   Patient presents with   • Abdominal Pain       HPI:  Patient was seen and examined this morning. Patient states his dyspnea resolved , and his abd discomfort and nausea has significantly improved.   Patient is tolerating diet , passing gas, Patient denies fever or chills, headache or dizziness, chest pain or palpitations, difficulty in breathing or cough, abdominal pain V/D , blood in the urine or blood in the stool , or any urinary symptoms , weakness or numbness or tingling in the extremities or visual changes.    Review of Systems     All pertinent negatives and positives are as above. All other systems have been reviewed and are negative unless otherwise stated.   Objective    Physical exam    Temp:  [98.5 °F (36.9 °C)-99.4 °F (37.4 °C)] 99.2 °F (37.3 °C)  Heart Rate:  [] 89  Resp:  [18] 18  BP: (104-137)/(62-94) 104/64    -Constitutional: Patient is AAO x 3. Patient appears well-developed and well-nourished.   -CVS: Normal rate, regular rhythm, normal heart sounds and intact distal pulses.  Exam reveals no gallop and no friction rub.  No murmur heard.  -Pulm: Effort normal and breath sounds normal. No respiratory distress. No wheezes, rales or ronchi.  -Abdominal: distended, non tender  ( improving )  -Musculoskeletal: No Cyanosis clubbing . B/l edema ( improved )  Results Review:    Laboratory Data:     Results from last 7 days  Lab Units 01/13/18  1050 01/13/18  0434 01/12/18  0700 01/12/18  0129   SODIUM mmol/L 138 140 136* 137   POTASSIUM mmol/L 3.8 3.9 3.9 3.4*   CHLORIDE mmol/L 104 104 106 104   CO2 mmol/L 27.0 28.0 25.0 21.0*   BUN mg/dL 8 7 8 8   CREATININE mg/dL 1.19 1.22 0.93 0.92   GLUCOSE mg/dL 99 107* 88 86   CALCIUM mg/dL 8.0* 8.1* 8.5 8.0*   BILIRUBIN mg/dL 0.5   0.5  --   --  0.8   ALK PHOS U/L 76  --   --  91   ALT (SGPT) U/L 26  --   --  26   AST (SGOT) U/L 28  --   --  30   ANION GAP mmol/L 7.0 8.0 5.0 12.0     Estimated Creatinine Clearance: 100.7 mL/min (by C-G formula based on Cr of 1.19).            Results from last 7 days  Lab Units 01/13/18  0434 01/12/18  0700 01/12/18  0129   WBC 10*3/mm3 3.37 3.51 3.61   HEMOGLOBIN g/dL 10.8* 9.8* 9.9*   HEMATOCRIT % 31.6* 28.1* 28.6*   PLATELETS 10*3/mm3 148* 126* 127*       Results from last 7 days  Lab Units 01/12/18  0129   INR  1.30*       Culture Data:   No results found for: BLOODCX  No results found for: URINECX  No results found for: RESPCX  No results found for: WOUNDCX  No results found for: STOOLCX  No components found for: BODYFLD    Micobiology                            Radiology Data:   Imaging Results (all)     Procedure Component Value Units Date/Time    XR Chest 1 View [[id number]] Collected:  01/12/18 0149     Updated:  01/12/18 0214    Narrative:       Exam: AP portable chest    INDICATION: Shortness of breath    FINDINGS: AP portable chest. There is a fixation plate in the  left humerus. The lungs are hypoinflated which accentuates heart  size and bronchovascular markings. Heart size normal. There is  atelectasis and/or infiltrate in the right lower lung. Mild  atelectasis in left lower lung.      Impression:       Atelectasis and/or infiltrate in the right lower  lung. Recommend follow-up..    Electronically signed by:  Kobe Easley MD  1/12/2018 2:13 AM  CST Workstation: [id number]     Paracentesis [[id number]] Collected:  01/12/18 1011    Specimen:  Body Fluid Updated:  01/12/18 1144    Narrative:         PROCEDURE: Ultrasound-guided paracentesis    COMPARISON: None    HISTORY: Ascites    TECHNIQUE:  The risks, benefits and alternatives were explained to the  patient who had ample opportunity to ask questions. The patient  agreed to proceed and informed consent was obtained.    An adequate fluid  pocket was identified in the left lower  quadrant. The site was marked then prepped and draped in sterile  fashion. Approximately 5 mL of 2% lidocaine solution was used for  local superficial and deep anesthesia. A 5 Chinese Yueh needle was  inserted into the peritoneal cavity under sonographic guidance.    FINDINGS:   Approximately 10.8 L of clear, serous fluid was removed. There  were no immediate post procedure complications.      Impression:       CONCLUSION:    Successful ultrasound-guided paracentesis, as described above.    Electronically signed by:  Adelfo Escobar MD  1/12/2018 11:28 AM  CST Workstation: [id number]    XR Chest 1 View [[id number]] Collected:  01/12/18 1142     Updated:  01/12/18 1210    Narrative:         PROCEDURE: Single chest view AP    REASON FOR EXAM:dyspnea, K70.31 Alcoholic cirrhosis of liver with  ascites R06.02 Shortness of breath    FINDINGS: Comparison exam dated January 12, 2018. Cardiac and  pulmonary vasculature are normal. Improvement in right lung base  patchy opacity with improved aeration in this region. Otherwise  lungs are clear. Very small right pleural effusion along right  minor fissure. No acute osseous abnormality.      Impression:       1.  Improvement in right lung base patchy opacity with improved  aeration suggesting resolving atelectasis and/or pneumonia.  2.  Very small right pleural effusion along right minor fissure.    Electronically signed by:  Ravi Hughes MD  1/12/2018 12:08 PM CST  Workstation: [id number]          I have reviewed the patient current medications.   Assessment/Plan     Hospital Problem List     Ascites due to alcoholic cirrhosis          Assessment / Plan    --Massive ascites:    Therapeutic paracentesis on 1/12/18 , 10.8 L removed  Symptoms improving.  no stigmata of infection, labs on fluid pending  lasix, aldactone, and nadolol .repeat CXR improved   Will consider another therapeutic paracentesis on Monday    --Cirrhosis:   Likely secondary to  alcohol use  As above.  Patient has johan scheduled with Dr Sandoval as outpatient to be established as new patient     --Tobacco abuse:   Cessation counseling done.    --DVT Prophylaxis:    SCDs.    This document has been electronically signed by Chuck Barnett MD on January 13, 2018 12:22 PM

## 2018-01-13 NOTE — PLAN OF CARE
Problem: Patient Care Overview (Adult)  Goal: Plan of Care Review  Outcome: Ongoing (interventions implemented as appropriate)   01/13/18 0159   Patient Care Overview   Progress no change   Outcome Evaluation   Outcome Summary/Follow up Plan Paracentesis performed today. Pt much more comfortable at this time. VSS. No new complaints.   Coping/Psychosocial Response Interventions   Plan Of Care Reviewed With patient

## 2018-01-14 LAB
ANION GAP SERPL CALCULATED.3IONS-SCNC: 8 MMOL/L (ref 5–15)
BASOPHILS # BLD AUTO: 0.01 10*3/MM3 (ref 0–0.2)
BASOPHILS NFR BLD AUTO: 0.3 % (ref 0–2)
BUN BLD-MCNC: 11 MG/DL (ref 7–21)
BUN/CREAT SERPL: 8.3 (ref 7–25)
CALCIUM SPEC-SCNC: 8.1 MG/DL (ref 8.4–10.2)
CHLORIDE SERPL-SCNC: 102 MMOL/L (ref 95–110)
CO2 SERPL-SCNC: 28 MMOL/L (ref 22–31)
CREAT BLD-MCNC: 1.32 MG/DL (ref 0.7–1.3)
DEPRECATED RDW RBC AUTO: 47.1 FL (ref 35.1–43.9)
EOSINOPHIL # BLD AUTO: 0.07 10*3/MM3 (ref 0–0.7)
EOSINOPHIL NFR BLD AUTO: 2 % (ref 0–7)
ERYTHROCYTE [DISTWIDTH] IN BLOOD BY AUTOMATED COUNT: 14.3 % (ref 11.5–14.5)
GFR SERPL CREATININE-BSD FRML MDRD: 61 ML/MIN/1.73 (ref 60–162)
GLUCOSE BLD-MCNC: 101 MG/DL (ref 60–100)
HCT VFR BLD AUTO: 31.2 % (ref 39–49)
HGB BLD-MCNC: 10.6 G/DL (ref 13.7–17.3)
IMM GRANULOCYTES # BLD: 0 10*3/MM3 (ref 0–0.02)
IMM GRANULOCYTES NFR BLD: 0 % (ref 0–0.5)
LYMPHOCYTES # BLD AUTO: 1.44 10*3/MM3 (ref 0.6–4.2)
LYMPHOCYTES NFR BLD AUTO: 41.6 % (ref 10–50)
MCH RBC QN AUTO: 30.5 PG (ref 26.5–34)
MCHC RBC AUTO-ENTMCNC: 34 G/DL (ref 31.5–36.3)
MCV RBC AUTO: 89.9 FL (ref 80–98)
MONOCYTES # BLD AUTO: 0.27 10*3/MM3 (ref 0–0.9)
MONOCYTES NFR BLD AUTO: 7.8 % (ref 0–12)
NEUTROPHILS # BLD AUTO: 1.67 10*3/MM3 (ref 2–8.6)
NEUTROPHILS NFR BLD AUTO: 48.3 % (ref 37–80)
PLATELET # BLD AUTO: 142 10*3/MM3 (ref 150–450)
PMV BLD AUTO: 10.6 FL (ref 8–12)
POTASSIUM BLD-SCNC: 3.8 MMOL/L (ref 3.5–5.1)
RBC # BLD AUTO: 3.47 10*6/MM3 (ref 4.37–5.74)
SODIUM BLD-SCNC: 138 MMOL/L (ref 137–145)
WBC NRBC COR # BLD: 3.46 10*3/MM3 (ref 3.2–9.8)

## 2018-01-14 PROCEDURE — 25010000002 ONDANSETRON PER 1 MG: Performed by: FAMILY MEDICINE

## 2018-01-14 PROCEDURE — P9041 ALBUMIN (HUMAN),5%, 50ML: HCPCS | Performed by: FAMILY MEDICINE

## 2018-01-14 PROCEDURE — 85025 COMPLETE CBC W/AUTO DIFF WBC: CPT | Performed by: HOSPITALIST

## 2018-01-14 PROCEDURE — 25010000002 ALBUMIN HUMAN 5% PER 50 ML: Performed by: FAMILY MEDICINE

## 2018-01-14 PROCEDURE — 80048 BASIC METABOLIC PNL TOTAL CA: CPT | Performed by: HOSPITALIST

## 2018-01-14 PROCEDURE — 25010000002 FUROSEMIDE PER 20 MG: Performed by: FAMILY MEDICINE

## 2018-01-14 RX ORDER — PANTOPRAZOLE SODIUM 40 MG/1
40 TABLET, DELAYED RELEASE ORAL
Status: DISCONTINUED | OUTPATIENT
Start: 2018-01-14 | End: 2018-01-16 | Stop reason: HOSPADM

## 2018-01-14 RX ORDER — ALBUMIN, HUMAN INJ 5% 5 %
25 SOLUTION INTRAVENOUS ONCE
Status: COMPLETED | OUTPATIENT
Start: 2018-01-14 | End: 2018-01-14

## 2018-01-14 RX ADMIN — QUETIAPINE 200 MG: 100 TABLET ORAL at 20:21

## 2018-01-14 RX ADMIN — OXYCODONE HYDROCHLORIDE 10 MG: 10 TABLET, FILM COATED, EXTENDED RELEASE ORAL at 20:21

## 2018-01-14 RX ADMIN — PANTOPRAZOLE SODIUM 40 MG: 40 TABLET, DELAYED RELEASE ORAL at 17:58

## 2018-01-14 RX ADMIN — ONDANSETRON 4 MG: 2 INJECTION INTRAMUSCULAR; INTRAVENOUS at 08:38

## 2018-01-14 RX ADMIN — NICOTINE 1 PATCH: 21 PATCH TRANSDERMAL at 05:30

## 2018-01-14 RX ADMIN — OXYCODONE HYDROCHLORIDE 10 MG: 10 TABLET, FILM COATED, EXTENDED RELEASE ORAL at 11:28

## 2018-01-14 RX ADMIN — ALBUMIN HUMAN 25 G: 0.05 INJECTION, SOLUTION INTRAVENOUS at 11:19

## 2018-01-14 RX ADMIN — NADOLOL 20 MG: 20 TABLET ORAL at 08:39

## 2018-01-14 RX ADMIN — LACTULOSE 20 G: 20 SOLUTION ORAL at 08:38

## 2018-01-14 RX ADMIN — ONDANSETRON 4 MG: 2 INJECTION INTRAMUSCULAR; INTRAVENOUS at 14:30

## 2018-01-14 RX ADMIN — ONDANSETRON 4 MG: 2 INJECTION INTRAMUSCULAR; INTRAVENOUS at 05:24

## 2018-01-14 RX ADMIN — ONDANSETRON 4 MG: 2 INJECTION INTRAMUSCULAR; INTRAVENOUS at 17:59

## 2018-01-14 RX ADMIN — FUROSEMIDE 20 MG: 10 INJECTION, SOLUTION INTRAMUSCULAR; INTRAVENOUS at 00:39

## 2018-01-14 RX ADMIN — ONDANSETRON 4 MG: 2 INJECTION INTRAMUSCULAR; INTRAVENOUS at 21:46

## 2018-01-14 RX ADMIN — LACTULOSE 20 G: 20 SOLUTION ORAL at 20:21

## 2018-01-14 RX ADMIN — OXYCODONE HYDROCHLORIDE 10 MG: 10 TABLET, FILM COATED, EXTENDED RELEASE ORAL at 03:14

## 2018-01-14 RX ADMIN — SPIRONOLACTONE 25 MG: 25 TABLET ORAL at 08:39

## 2018-01-14 RX ADMIN — HYDROMORPHONE HYDROCHLORIDE 0.25 MG: 10 INJECTION, SOLUTION INTRAMUSCULAR; INTRAVENOUS; SUBCUTANEOUS at 21:57

## 2018-01-14 RX ADMIN — ONDANSETRON 4 MG: 2 INJECTION INTRAMUSCULAR; INTRAVENOUS at 00:39

## 2018-01-14 RX ADMIN — QUETIAPINE 200 MG: 100 TABLET ORAL at 08:39

## 2018-01-14 NOTE — PROGRESS NOTES
[facility] Medicine Services  INPATIENT PROGRESS NOTE    Length of Stay: 0  Date of Admission: 1/12/2018  Primary Care Physician: Walt Costello Jr, MD    Subjective     Chief Complaint   Patient presents with   • Abdominal Pain       HPI:  Patient was seen and examined this morning. Patient states his dyspnea resolved , and his abd discomfort and nausea continue to improve and tolerating diet .  Patient is tolerating diet , passing gas, Patient denies fever or chills, headache or dizziness, chest pain or palpitations, difficulty in breathing or cough, abdominal pain V/D , blood in the urine or blood in the stool , or any urinary symptoms , weakness or numbness or tingling in the extremities or visual changes.    Review of Systems     All pertinent negatives and positives are as above. All other systems have been reviewed and are negative unless otherwise stated.   Objective    Physical exam    Temp:  [97 °F (36.1 °C)-100.1 °F (37.8 °C)] 98.5 °F (36.9 °C)  Heart Rate:  [76-91] 86  Resp:  [16-18] 16  BP: ()/(57-70) 99/57    -Constitutional: Patient is AAO x 3. Patient appears well-developed and well-nourished.   -CVS: Normal rate, regular rhythm, normal heart sounds and intact distal pulses.  Exam reveals no gallop and no friction rub.  No murmur heard.  -Pulm: Effort normal and breath sounds normal. No respiratory distress. No wheezes, rales or ronchi.  -Abdominal: distended, non tender  ( improving )  -Musculoskeletal: No Cyanosis clubbing . B/l edema ( improved )  Results Review:    Laboratory Data:     Results from last 7 days  Lab Units 01/14/18  0507 01/13/18  1050 01/13/18  0434  01/12/18  0129   SODIUM mmol/L 138 138 140  < > 137   POTASSIUM mmol/L 3.8 3.8 3.9  < > 3.4*   CHLORIDE mmol/L 102 104 104  < > 104   CO2 mmol/L 28.0 27.0 28.0  < > 21.0*   BUN mg/dL 11 8 7  < > 8   CREATININE mg/dL 1.32* 1.19 1.22  < > 0.92   GLUCOSE mg/dL 101* 99 107*  < > 86   CALCIUM  mg/dL 8.1* 8.0* 8.1*  < > 8.0*   BILIRUBIN mg/dL  --  0.5  0.5  --   --  0.8   ALK PHOS U/L  --  76  --   --  91   ALT (SGPT) U/L  --  26  --   --  26   AST (SGOT) U/L  --  28  --   --  30   ANION GAP mmol/L 8.0 7.0 8.0  < > 12.0   < > = values in this interval not displayed.  Estimated Creatinine Clearance: 100.2 mL/min (by C-G formula based on Cr of 1.32).            Results from last 7 days  Lab Units 01/14/18  0507 01/13/18  0434 01/12/18  0700 01/12/18  0129   WBC 10*3/mm3 3.46 3.37 3.51 3.61   HEMOGLOBIN g/dL 10.6* 10.8* 9.8* 9.9*   HEMATOCRIT % 31.2* 31.6* 28.1* 28.6*   PLATELETS 10*3/mm3 142* 148* 126* 127*       Results from last 7 days  Lab Units 01/12/18  0129   INR  1.30*       Culture Data:   No results found for: BLOODCX  No results found for: URINECX  No results found for: RESPCX  No results found for: WOUNDCX  No results found for: STOOLCX  No components found for: BODYFLD    Micobiology                            Radiology Data:   Imaging Results (all)     Procedure Component Value Units Date/Time    XR Chest 1 View [[id number]] Collected:  01/12/18 0149     Updated:  01/12/18 0214    Narrative:       Exam: AP portable chest    INDICATION: Shortness of breath    FINDINGS: AP portable chest. There is a fixation plate in the  left humerus. The lungs are hypoinflated which accentuates heart  size and bronchovascular markings. Heart size normal. There is  atelectasis and/or infiltrate in the right lower lung. Mild  atelectasis in left lower lung.      Impression:       Atelectasis and/or infiltrate in the right lower  lung. Recommend follow-up..    Electronically signed by:  Kobe Easley MD  1/12/2018 2:13 AM  CST Workstation: [id number]     Paracentesis [[id number]] Collected:  01/12/18 1011    Specimen:  Body Fluid Updated:  01/12/18 1144    Narrative:         PROCEDURE: Ultrasound-guided paracentesis    COMPARISON: None    HISTORY: Ascites    TECHNIQUE:  The risks, benefits and alternatives  were explained to the  patient who had ample opportunity to ask questions. The patient  agreed to proceed and informed consent was obtained.    An adequate fluid pocket was identified in the left lower  quadrant. The site was marked then prepped and draped in sterile  fashion. Approximately 5 mL of 2% lidocaine solution was used for  local superficial and deep anesthesia. A 5 Nepali Yueh needle was  inserted into the peritoneal cavity under sonographic guidance.    FINDINGS:   Approximately 10.8 L of clear, serous fluid was removed. There  were no immediate post procedure complications.      Impression:       CONCLUSION:    Successful ultrasound-guided paracentesis, as described above.    Electronically signed by:  Adelfo Escobar MD  1/12/2018 11:28 AM  CST Workstation: [id number]    XR Chest 1 View [[id number]] Collected:  01/12/18 1142     Updated:  01/12/18 1210    Narrative:         PROCEDURE: Single chest view AP    REASON FOR EXAM:dyspnea, K70.31 Alcoholic cirrhosis of liver with  ascites R06.02 Shortness of breath    FINDINGS: Comparison exam dated January 12, 2018. Cardiac and  pulmonary vasculature are normal. Improvement in right lung base  patchy opacity with improved aeration in this region. Otherwise  lungs are clear. Very small right pleural effusion along right  minor fissure. No acute osseous abnormality.      Impression:       1.  Improvement in right lung base patchy opacity with improved  aeration suggesting resolving atelectasis and/or pneumonia.  2.  Very small right pleural effusion along right minor fissure.    Electronically signed by:  Ravi Hughes MD  1/12/2018 12:08 PM CST  Workstation: [id number]          I have reviewed the patient current medications.   Assessment/Plan     Hospital Problem List     Ascites due to alcoholic cirrhosis          Assessment / Plan    --Massive ascites:    Therapeutic paracentesis on 1/12/18 , 10.8 L removed  Symptoms improving.  no stigmata of infection, labs on fluid  pending  aldactone, and nadolol .repeat CXR improved   Lasix held today as Cr slightly increased  Will consider another therapeutic paracentesis on Monday     --Cirrhosis:   Likely secondary to alcohol use  As above.  Patient has johan scheduled with Dr Sandoval as outpatient to be established as new patient next week    --Tobacco abuse:   Cessation counseling done.    --DVT Prophylaxis:    SCDs.    This document has been electronically signed by Chuck Barnett MD on January 14, 2018 12:40 PM

## 2018-01-14 NOTE — PLAN OF CARE
Problem: Patient Care Overview (Adult)  Goal: Plan of Care Review  Outcome: Ongoing (interventions implemented as appropriate)   01/14/18 0300   Patient Care Overview   Progress no change   Outcome Evaluation   Outcome Summary/Follow up Plan Pt has rested most of this shft. Ordered pain meds changed today. Pt describing pain as better at this time.    Coping/Psychosocial Response Interventions   Plan Of Care Reviewed With patient

## 2018-01-15 ENCOUNTER — APPOINTMENT (OUTPATIENT)
Dept: ULTRASOUND IMAGING | Facility: HOSPITAL | Age: 38
End: 2018-01-15

## 2018-01-15 LAB
ANION GAP SERPL CALCULATED.3IONS-SCNC: 7 MMOL/L (ref 5–15)
BASOPHILS # BLD AUTO: 0.02 10*3/MM3 (ref 0–0.2)
BASOPHILS NFR BLD AUTO: 0.6 % (ref 0–2)
BUN BLD-MCNC: 11 MG/DL (ref 7–21)
BUN/CREAT SERPL: 9.2 (ref 7–25)
CALCIUM SPEC-SCNC: 8.2 MG/DL (ref 8.4–10.2)
CHLORIDE SERPL-SCNC: 103 MMOL/L (ref 95–110)
CO2 SERPL-SCNC: 26 MMOL/L (ref 22–31)
CREAT BLD-MCNC: 1.2 MG/DL (ref 0.7–1.3)
DEPRECATED RDW RBC AUTO: 47.1 FL (ref 35.1–43.9)
EOSINOPHIL # BLD AUTO: 0.06 10*3/MM3 (ref 0–0.7)
EOSINOPHIL NFR BLD AUTO: 1.8 % (ref 0–7)
ERYTHROCYTE [DISTWIDTH] IN BLOOD BY AUTOMATED COUNT: 14.3 % (ref 11.5–14.5)
GFR SERPL CREATININE-BSD FRML MDRD: 68 ML/MIN/1.73 (ref 60–162)
GLUCOSE BLD-MCNC: 92 MG/DL (ref 60–100)
HCT VFR BLD AUTO: 29.9 % (ref 39–49)
HGB BLD-MCNC: 10.5 G/DL (ref 13.7–17.3)
IMM GRANULOCYTES # BLD: 0.01 10*3/MM3 (ref 0–0.02)
IMM GRANULOCYTES NFR BLD: 0.3 % (ref 0–0.5)
LAB AP CASE REPORT: NORMAL
LAB AP DIAGNOSIS COMMENT: NORMAL
LYMPHOCYTES # BLD AUTO: 1.61 10*3/MM3 (ref 0.6–4.2)
LYMPHOCYTES NFR BLD AUTO: 49.1 % (ref 10–50)
Lab: NORMAL
MCH RBC QN AUTO: 31.3 PG (ref 26.5–34)
MCHC RBC AUTO-ENTMCNC: 35.1 G/DL (ref 31.5–36.3)
MCV RBC AUTO: 89.3 FL (ref 80–98)
MONOCYTES # BLD AUTO: 0.31 10*3/MM3 (ref 0–0.9)
MONOCYTES NFR BLD AUTO: 9.5 % (ref 0–12)
NEUTROPHILS # BLD AUTO: 1.27 10*3/MM3 (ref 2–8.6)
NEUTROPHILS NFR BLD AUTO: 38.7 % (ref 37–80)
PATH REPORT.FINAL DX SPEC: NORMAL
PLATELET # BLD AUTO: 128 10*3/MM3 (ref 150–450)
PMV BLD AUTO: 10.4 FL (ref 8–12)
POTASSIUM BLD-SCNC: 3.8 MMOL/L (ref 3.5–5.1)
RBC # BLD AUTO: 3.35 10*6/MM3 (ref 4.37–5.74)
SODIUM BLD-SCNC: 136 MMOL/L (ref 137–145)
STAT OF ADQ CVX/VAG CYTO-IMP: NORMAL
WBC NRBC COR # BLD: 3.28 10*3/MM3 (ref 3.2–9.8)

## 2018-01-15 PROCEDURE — 0W9G3ZX DRAINAGE OF PERITONEAL CAVITY, PERCUTANEOUS APPROACH, DIAGNOSTIC: ICD-10-PCS | Performed by: RADIOLOGY

## 2018-01-15 PROCEDURE — P9041 ALBUMIN (HUMAN),5%, 50ML: HCPCS | Performed by: FAMILY MEDICINE

## 2018-01-15 PROCEDURE — 25010000002 ONDANSETRON PER 1 MG: Performed by: FAMILY MEDICINE

## 2018-01-15 PROCEDURE — 80048 BASIC METABOLIC PNL TOTAL CA: CPT | Performed by: HOSPITALIST

## 2018-01-15 PROCEDURE — 25010000002 ALBUMIN HUMAN 5% PER 50 ML: Performed by: FAMILY MEDICINE

## 2018-01-15 PROCEDURE — 76942 ECHO GUIDE FOR BIOPSY: CPT

## 2018-01-15 PROCEDURE — 85025 COMPLETE CBC W/AUTO DIFF WBC: CPT | Performed by: HOSPITALIST

## 2018-01-15 RX ORDER — ALBUMIN, HUMAN INJ 5% 5 %
25 SOLUTION INTRAVENOUS ONCE
Status: COMPLETED | OUTPATIENT
Start: 2018-01-15 | End: 2018-01-15

## 2018-01-15 RX ADMIN — QUETIAPINE 200 MG: 100 TABLET ORAL at 21:00

## 2018-01-15 RX ADMIN — NICOTINE 1 PATCH: 21 PATCH TRANSDERMAL at 04:47

## 2018-01-15 RX ADMIN — OXYCODONE HYDROCHLORIDE 10 MG: 10 TABLET, FILM COATED, EXTENDED RELEASE ORAL at 11:05

## 2018-01-15 RX ADMIN — Medication 10 ML: at 15:04

## 2018-01-15 RX ADMIN — ONDANSETRON 4 MG: 2 INJECTION INTRAMUSCULAR; INTRAVENOUS at 17:47

## 2018-01-15 RX ADMIN — LACTULOSE 20 G: 20 SOLUTION ORAL at 21:01

## 2018-01-15 RX ADMIN — ALBUMIN HUMAN 25 G: 0.05 INJECTION, SOLUTION INTRAVENOUS at 11:57

## 2018-01-15 RX ADMIN — Medication 10 ML: at 17:47

## 2018-01-15 RX ADMIN — QUETIAPINE 200 MG: 100 TABLET ORAL at 08:48

## 2018-01-15 RX ADMIN — NADOLOL 20 MG: 20 TABLET ORAL at 08:48

## 2018-01-15 RX ADMIN — Medication 3 ML: at 08:48

## 2018-01-15 RX ADMIN — SPIRONOLACTONE 25 MG: 25 TABLET ORAL at 08:48

## 2018-01-15 RX ADMIN — Medication 3 ML: at 11:57

## 2018-01-15 RX ADMIN — ONDANSETRON 4 MG: 2 INJECTION INTRAMUSCULAR; INTRAVENOUS at 21:01

## 2018-01-15 RX ADMIN — PANTOPRAZOLE SODIUM 40 MG: 40 TABLET, DELAYED RELEASE ORAL at 08:48

## 2018-01-15 RX ADMIN — ONDANSETRON 4 MG: 2 INJECTION INTRAMUSCULAR; INTRAVENOUS at 15:03

## 2018-01-15 RX ADMIN — ONDANSETRON 4 MG: 2 INJECTION INTRAMUSCULAR; INTRAVENOUS at 04:47

## 2018-01-15 RX ADMIN — LACTULOSE 20 G: 20 SOLUTION ORAL at 08:47

## 2018-01-15 RX ADMIN — OXYCODONE HYDROCHLORIDE 10 MG: 10 TABLET, FILM COATED, EXTENDED RELEASE ORAL at 04:47

## 2018-01-15 RX ADMIN — ONDANSETRON 4 MG: 2 INJECTION INTRAMUSCULAR; INTRAVENOUS at 08:48

## 2018-01-15 RX ADMIN — PANTOPRAZOLE SODIUM 40 MG: 40 TABLET, DELAYED RELEASE ORAL at 17:47

## 2018-01-15 RX ADMIN — OXYCODONE HYDROCHLORIDE 10 MG: 10 TABLET, FILM COATED, EXTENDED RELEASE ORAL at 19:22

## 2018-01-15 NOTE — PROGRESS NOTES
[facility] Medicine Services  INPATIENT PROGRESS NOTE    Length of Stay: 1  Date of Admission: 1/12/2018  Primary Care Physician: Walt Costello Jr, MD    Subjective     Chief Complaint   Patient presents with   • Abdominal Pain       HPI:  Patient was seen and examined this morning. Patient states his dyspnea resolved , and his abd discomfort and nausea continue to improve and he is  tolerating diet with only mild nausea.  Patient is tolerating diet , passing gas, Patient denies fever or chills, headache or dizziness, chest pain or palpitations, difficulty in breathing or cough, abdominal pain V/D , blood in the urine or blood in the stool , or any urinary symptoms , weakness or numbness or tingling in the extremities or visual changes.    Review of Systems     All pertinent negatives and positives are as above. All other systems have been reviewed and are negative unless otherwise stated.   Objective    Physical exam    Temp:  [98 °F (36.7 °C)-98.3 °F (36.8 °C)] 98.1 °F (36.7 °C)  Heart Rate:  [83-93] 93  Resp:  [16-18] 18  BP: (112-149)/(62-73) 112/69    -Constitutional: Patient is AAO x 3. Patient appears well-developed and well-nourished.   -CVS: Normal rate, regular rhythm, normal heart sounds and intact distal pulses.  Exam reveals no gallop and no friction rub.  No murmur heard.  -Pulm: Effort normal and breath sounds normal. No respiratory distress. No wheezes, rales or ronchi.  -Abdominal: distended, non tender  ( improving )  -Musculoskeletal: No Cyanosis clubbing . B/l edema ( improved )  Results Review:    Laboratory Data:     Results from last 7 days  Lab Units 01/15/18  0519 01/14/18  0507 01/13/18  1050  01/12/18  0129   SODIUM mmol/L 136* 138 138  < > 137   POTASSIUM mmol/L 3.8 3.8 3.8  < > 3.4*   CHLORIDE mmol/L 103 102 104  < > 104   CO2 mmol/L 26.0 28.0 27.0  < > 21.0*   BUN mg/dL 11 11 8  < > 8   CREATININE mg/dL 1.20 1.32* 1.19  < > 0.92   GLUCOSE mg/dL  92 101* 99  < > 86   CALCIUM mg/dL 8.2* 8.1* 8.0*  < > 8.0*   BILIRUBIN mg/dL  --   --  0.5  0.5  --  0.8   ALK PHOS U/L  --   --  76  --  91   ALT (SGPT) U/L  --   --  26  --  26   AST (SGOT) U/L  --   --  28  --  30   ANION GAP mmol/L 7.0 8.0 7.0  < > 12.0   < > = values in this interval not displayed.  Estimated Creatinine Clearance: 111 mL/min (by C-G formula based on Cr of 1.2).            Results from last 7 days  Lab Units 01/15/18  0519 01/14/18  0507 01/13/18  0434 01/12/18  0700 01/12/18  0129   WBC 10*3/mm3 3.28 3.46 3.37 3.51 3.61   HEMOGLOBIN g/dL 10.5* 10.6* 10.8* 9.8* 9.9*   HEMATOCRIT % 29.9* 31.2* 31.6* 28.1* 28.6*   PLATELETS 10*3/mm3 128* 142* 148* 126* 127*       Results from last 7 days  Lab Units 01/12/18  0129   INR  1.30*       Culture Data:   No results found for: BLOODCX  No results found for: URINECX  No results found for: RESPCX  No results found for: WOUNDCX  No results found for: STOOLCX  No components found for: BODYFLD    Micobiology                            Radiology Data:   Imaging Results (all)     Procedure Component Value Units Date/Time    XR Chest 1 View [[id number]] Collected:  01/12/18 0149     Updated:  01/12/18 0214    Narrative:       Exam: AP portable chest    INDICATION: Shortness of breath    FINDINGS: AP portable chest. There is a fixation plate in the  left humerus. The lungs are hypoinflated which accentuates heart  size and bronchovascular markings. Heart size normal. There is  atelectasis and/or infiltrate in the right lower lung. Mild  atelectasis in left lower lung.      Impression:       Atelectasis and/or infiltrate in the right lower  lung. Recommend follow-up..    Electronically signed by:  Kobe Easley MD  1/12/2018 2:13 AM  CST Workstation: [id number]    US Paracentesis [[id number]] Collected:  01/12/18 1011    Specimen:  Body Fluid Updated:  01/12/18 1144    Narrative:         PROCEDURE: Ultrasound-guided paracentesis    COMPARISON: None    HISTORY:  Ascites    TECHNIQUE:  The risks, benefits and alternatives were explained to the  patient who had ample opportunity to ask questions. The patient  agreed to proceed and informed consent was obtained.    An adequate fluid pocket was identified in the left lower  quadrant. The site was marked then prepped and draped in sterile  fashion. Approximately 5 mL of 2% lidocaine solution was used for  local superficial and deep anesthesia. A 5 Latvian Yueh needle was  inserted into the peritoneal cavity under sonographic guidance.    FINDINGS:   Approximately 10.8 L of clear, serous fluid was removed. There  were no immediate post procedure complications.      Impression:       CONCLUSION:    Successful ultrasound-guided paracentesis, as described above.    Electronically signed by:  Adelfo Escobar MD  1/12/2018 11:28 AM  CST Workstation: [id number]    XR Chest 1 View [[id number]] Collected:  01/12/18 1142     Updated:  01/12/18 1210    Narrative:         PROCEDURE: Single chest view AP    REASON FOR EXAM:dyspnea, K70.31 Alcoholic cirrhosis of liver with  ascites R06.02 Shortness of breath    FINDINGS: Comparison exam dated January 12, 2018. Cardiac and  pulmonary vasculature are normal. Improvement in right lung base  patchy opacity with improved aeration in this region. Otherwise  lungs are clear. Very small right pleural effusion along right  minor fissure. No acute osseous abnormality.      Impression:       1.  Improvement in right lung base patchy opacity with improved  aeration suggesting resolving atelectasis and/or pneumonia.  2.  Very small right pleural effusion along right minor fissure.    Electronically signed by:  Ravi Hughes MD  1/12/2018 12:08 PM CST  Workstation: [id number]          I have reviewed the patient current medications.   Assessment/Plan     Hospital Problem List     Ascites due to alcoholic cirrhosis          Assessment / Plan    --Massive ascites:    Therapeutic paracentesis on 1/12/18 , 10.8 L  removed  Symptoms improving.  no stigmata of infection, labs on fluid pending  aldactone, and nadolol .repeat CXR improved   Lasix held  as Cr slightly increased  Cr today  Improved to 1.2  Will perform another  therapeutic paracentesis today and administer another albumin 25g    --Cirrhosis:   Likely secondary to alcohol use  As above.  Patient has johan scheduled with Dr Sandoval as outpatient to be established as new patient on 1/31/18    --Tobacco abuse:   Cessation counseling done.    --DVT Prophylaxis:    SCDs.    This document has been electronically signed by Chuck Barnett MD on January 15, 2018 11:02 AM

## 2018-01-15 NOTE — PLAN OF CARE
Problem: Patient Care Overview (Adult)  Goal: Plan of Care Review  Outcome: Ongoing (interventions implemented as appropriate)   01/15/18 0530   Patient Care Overview   Progress no change   Outcome Evaluation   Outcome Summary/Follow up Plan Pt has c/o increasing abdominal pain this shift. Continues to c/o anxiety. VSS.    Coping/Psychosocial Response Interventions   Plan Of Care Reviewed With patient

## 2018-01-15 NOTE — PROGRESS NOTES
Discharge Planning Assessment  [facility]     Patient Name: Jung Cadet  MRN: [id number]  Today's Date: 1/15/2018    Admit Date: 1/12/2018          Discharge Needs Assessment       01/15/18 133    Living Environment    Lives With parent(s)    Living Arrangements house    Type of Financial/Environmental Concern none    Transportation Available car;family or friend will provide    Living Environment    Provides Primary Care For no one    Quality Of Family Relationships supportive    Able to Return to Prior Living Arrangements yes    Living Arrangement Comments Pt resides at home with his father. pt appears to have limited family support.    Discharge Needs Assessment    Concerns To Be Addressed adjustment to diagnosis/illness concerns;substance/tobacco abuse/use concerns;compliance issue concerns    Concerns Comments Alcohol abuse    Equipment Currently Used at Home cane, straight;wheelchair    Discharge Facility/Level Of Care Needs home with home health    Current Discharge Risk chronically ill;physical impairment;substance abuse    Discharge Disposition still a patient            Discharge Plan       01/15/18 2146    Case Management/Social Work Plan    Additional Comments LSW assesment complete. pt resides at home with his father. pt appears to have limited family support. pt has long history of alcohol abuse. pt reports being independent prior to hospitalization however for past two weeks he has been more weak and SOA. pt plans to return home at d/c and is agreeable to home health follow up. LSW is awaiting additional recomendations from MD and therapy. LSW/case mgt will follow up as consulted and complete arrangements as ordered.        Discharge Placement     No information found        Expected Discharge Date and Time     Expected Discharge Date Expected Discharge Time    Jan 13, 2018               Demographic Summary       01/15/18 1322    Referral Information    Admission Type inpatient    Referral  Source high risk screening    Reason For Consult discharge planning    Record Reviewed medical record    Contact Information    Permission Granted to Share Information With     Primary Care Physician Information    Name Dr Costello            Functional Status       01/15/18 3329    Functional Status Prior    Ambulation 1-->assistive equipment    Transferring 1-->assistive equipment    Toileting 0-->independent    Bathing 0-->independent    Dressing 0-->independent    Eating 0-->independent    Communication 0-->understands/communicates without difficulty    Swallowing 0-->swallows foods/liquids without difficulty    Prior Functional Level Comment Pt informed LSW that he has been having trouble with ADL's for past two weeks due to fatigue and SOA.    IADL    Medications independent    Meal Preparation independent    Housekeeping assistive person    Laundry assistive person    Shopping assistive person    Oral Care independent    Activity Tolerance    Current Activity Limitations weakness severe, generalized    Usual Activity Tolerance poor    Current Activity Tolerance poor    Cognitive/Perceptual/Developmental    Current Mental Status/Cognitive Functioning no deficits noted    Recent Changes in Mental Status/Cognitive Functioning no changes    Developmental Stage (Eriksson's Stages of Development) Stage 7 (35-65 years/Middle Adulthood) Generativity vs. Stagnation    Employment/Financial    Source Of Income disability            Psychosocial     None            Abuse/Neglect     None            Legal     None            Substance Abuse     None            Patient Forms     None          ANNY Wilkinson

## 2018-01-16 VITALS
BODY MASS INDEX: 26.1 KG/M2 | HEIGHT: 72 IN | DIASTOLIC BLOOD PRESSURE: 70 MMHG | RESPIRATION RATE: 18 BRPM | SYSTOLIC BLOOD PRESSURE: 106 MMHG | WEIGHT: 192.7 LBS | OXYGEN SATURATION: 96 % | TEMPERATURE: 97.8 F | HEART RATE: 96 BPM

## 2018-01-16 LAB
ANION GAP SERPL CALCULATED.3IONS-SCNC: 5 MMOL/L (ref 5–15)
BASOPHILS # BLD AUTO: 0.01 10*3/MM3 (ref 0–0.2)
BASOPHILS NFR BLD AUTO: 0.3 % (ref 0–2)
BUN BLD-MCNC: 12 MG/DL (ref 7–21)
BUN/CREAT SERPL: 9.9 (ref 7–25)
CALCIUM SPEC-SCNC: 8.4 MG/DL (ref 8.4–10.2)
CHLORIDE SERPL-SCNC: 103 MMOL/L (ref 95–110)
CO2 SERPL-SCNC: 27 MMOL/L (ref 22–31)
CREAT BLD-MCNC: 1.21 MG/DL (ref 0.7–1.3)
DEPRECATED RDW RBC AUTO: 47 FL (ref 35.1–43.9)
EOSINOPHIL # BLD AUTO: 0.11 10*3/MM3 (ref 0–0.7)
EOSINOPHIL NFR BLD AUTO: 2.9 % (ref 0–7)
ERYTHROCYTE [DISTWIDTH] IN BLOOD BY AUTOMATED COUNT: 14.3 % (ref 11.5–14.5)
GFR SERPL CREATININE-BSD FRML MDRD: 67 ML/MIN/1.73 (ref 70–162)
GLUCOSE BLD-MCNC: 104 MG/DL (ref 60–100)
HCT VFR BLD AUTO: 32.3 % (ref 39–49)
HGB BLD-MCNC: 11.1 G/DL (ref 13.7–17.3)
IMM GRANULOCYTES # BLD: 0.01 10*3/MM3 (ref 0–0.02)
IMM GRANULOCYTES NFR BLD: 0.3 % (ref 0–0.5)
LYMPHOCYTES # BLD AUTO: 1.44 10*3/MM3 (ref 0.6–4.2)
LYMPHOCYTES NFR BLD AUTO: 38 % (ref 10–50)
MCH RBC QN AUTO: 30.6 PG (ref 26.5–34)
MCHC RBC AUTO-ENTMCNC: 34.4 G/DL (ref 31.5–36.3)
MCV RBC AUTO: 89 FL (ref 80–98)
MONOCYTES # BLD AUTO: 0.41 10*3/MM3 (ref 0–0.9)
MONOCYTES NFR BLD AUTO: 10.8 % (ref 0–12)
NEUTROPHILS # BLD AUTO: 1.81 10*3/MM3 (ref 2–8.6)
NEUTROPHILS NFR BLD AUTO: 47.7 % (ref 37–80)
PLATELET # BLD AUTO: 148 10*3/MM3 (ref 150–450)
PMV BLD AUTO: 10.8 FL (ref 8–12)
POTASSIUM BLD-SCNC: 4 MMOL/L (ref 3.5–5.1)
RBC # BLD AUTO: 3.63 10*6/MM3 (ref 4.37–5.74)
SODIUM BLD-SCNC: 135 MMOL/L (ref 137–145)
WBC NRBC COR # BLD: 3.79 10*3/MM3 (ref 3.2–9.8)

## 2018-01-16 PROCEDURE — 85025 COMPLETE CBC W/AUTO DIFF WBC: CPT | Performed by: HOSPITALIST

## 2018-01-16 PROCEDURE — 25010000002 ONDANSETRON PER 1 MG: Performed by: FAMILY MEDICINE

## 2018-01-16 PROCEDURE — 80048 BASIC METABOLIC PNL TOTAL CA: CPT | Performed by: HOSPITALIST

## 2018-01-16 RX ORDER — PANTOPRAZOLE SODIUM 40 MG/1
40 TABLET, DELAYED RELEASE ORAL
Qty: 30 TABLET | Refills: 0 | Status: SHIPPED | OUTPATIENT
Start: 2018-01-16 | End: 2018-01-31

## 2018-01-16 RX ORDER — SPIRONOLACTONE 25 MG/1
25 TABLET ORAL DAILY
Qty: 30 TABLET | Refills: 0 | Status: SHIPPED | OUTPATIENT
Start: 2018-01-17 | End: 2018-01-18 | Stop reason: SDUPTHER

## 2018-01-16 RX ORDER — NADOLOL 20 MG/1
20 TABLET ORAL
Qty: 30 TABLET | Refills: 0 | Status: SHIPPED | OUTPATIENT
Start: 2018-01-17 | End: 2018-02-16

## 2018-01-16 RX ORDER — FUROSEMIDE 20 MG/1
20 TABLET ORAL 2 TIMES DAILY
Qty: 60 TABLET | Refills: 0 | Status: SHIPPED | OUTPATIENT
Start: 2018-01-16 | End: 2018-01-18 | Stop reason: SDUPTHER

## 2018-01-16 RX ADMIN — NADOLOL 20 MG: 20 TABLET ORAL at 08:56

## 2018-01-16 RX ADMIN — SPIRONOLACTONE 25 MG: 25 TABLET ORAL at 08:55

## 2018-01-16 RX ADMIN — NICOTINE 1 PATCH: 21 PATCH TRANSDERMAL at 05:56

## 2018-01-16 RX ADMIN — ONDANSETRON 4 MG: 2 INJECTION INTRAMUSCULAR; INTRAVENOUS at 08:55

## 2018-01-16 RX ADMIN — QUETIAPINE 200 MG: 100 TABLET ORAL at 08:55

## 2018-01-16 RX ADMIN — LACTULOSE 20 G: 20 SOLUTION ORAL at 08:55

## 2018-01-16 RX ADMIN — OXYCODONE HYDROCHLORIDE 10 MG: 10 TABLET, FILM COATED, EXTENDED RELEASE ORAL at 11:33

## 2018-01-16 RX ADMIN — PANTOPRAZOLE SODIUM 40 MG: 40 TABLET, DELAYED RELEASE ORAL at 08:55

## 2018-01-16 RX ADMIN — ONDANSETRON 4 MG: 2 INJECTION INTRAMUSCULAR; INTRAVENOUS at 03:14

## 2018-01-16 RX ADMIN — OXYCODONE HYDROCHLORIDE 10 MG: 10 TABLET, FILM COATED, EXTENDED RELEASE ORAL at 03:14

## 2018-01-16 RX ADMIN — ONDANSETRON 4 MG: 2 INJECTION INTRAMUSCULAR; INTRAVENOUS at 05:52

## 2018-01-16 RX ADMIN — Medication 3 ML: at 08:55

## 2018-01-16 NOTE — DISCHARGE PLACEMENT REQUEST
"Jung Cadet (37 y.o. Male)     Date of Birth Social Security Number Address Home Phone MRN    1980  [address] [place] [id number] [phone] [id number]    Church Marital Status          None Single       Admission Date Admission Type Admitting Provider Attending Provider Department, Room/Bed    1/12/18 Emergency Chuck Barnett MD Williams, Kevin L, MD [address], 401/1    Discharge Date Discharge Disposition Discharge Destination         Home or Self Care             Attending Provider: Aamir Dooley MD     Allergies:  No Known Allergies    Isolation:  None   Infection:  None   Code Status:  FULL    Ht:  182.9 cm (72\")   Wt:  87.4 kg (192 lb 11.2 oz)    Admission Cmt:  None   Principal Problem:  None                Active Insurance as of 1/12/2018     Primary Coverage     Payor Plan Insurance Group Employer/Plan Group    MEDICARE MEDICARE A & B      Payor Plan Address Payor Plan Phone Number Effective From Effective To    [address] [phone] 8/1/2004     [place] [address]       Subscriber Name Subscriber Birth Date Member ID       JUNG CADET 1980 [id number]                 Emergency Contacts      (Rel.) Home Phone Work Phone Mobile Phone    Adam Cadet (Father) [phone] -- [phone]               History & Physical      Aamir Dooley MD at 1/12/2018  9:34 AM                [facility] Medicine Admission      Date of Admission: 1/12/2018      Primary Care Physician: Walt Costello Jr, MD      Chief Complaint:  Abdominal pain    HPI:  Patient has a history of cirrhosis that was diagnosed in May of 2017.  He was treated at [facility] and required transfer to [place] for higher level of care.  While in [place], he underwent hemodialysis for hepatorenal syndrome.  He improved and went to SNF for rehab.  He went home from SNF in November.  He states that on December 18 he " had paracentesis with 7 liters of fluid removed.  He feels that all of the fluid has reaccumulated.  He also feels swelling in his legs.  He is currently having significant pain in his abdomen.  He is somewhat short of breath.  He states that he is completely sober, and hasn't had any alcohol in over a year.  He states that he didn't feel like all of the fluid was removed initially.  There are no alleviating or exacerbating factors.                                                                           Concurrent Medical History:  has a past medical history of Cirrhosis and Renal disorder.    Past Surgical History:  has a past surgical history that includes orthopedic surgery (Left).    Family History: Mom had an MI, Dad has COPD and HTN.    Social History:  reports that he has been smoking Cigarettes.  He has been smoking about 1.00 pack per day. He has never used smokeless tobacco. He reports that he does not drink alcohol or use illicit drugs.    Allergies: No Known Allergies    Medications:   Prescriptions Prior to Admission   Medication Sig Dispense Refill Last Dose   • lactulose (CHRONULAC) 10 GM/15ML solution Take 20 g by mouth 2 (Two) Times a Day.      • QUEtiapine XR (SEROquel XR) 400 MG 24 hr tablet Take 400 mg by mouth Every Night.          Review of Systems:  Review of Systems   Constitutional: Positive for activity change, appetite change and fever. Negative for chills, fatigue and unexpected weight change.   HENT: Negative for congestion, facial swelling, hearing loss, nosebleeds, rhinorrhea, sneezing, trouble swallowing and voice change.    Eyes: Negative for photophobia and visual disturbance.   Respiratory: Positive for shortness of breath. Negative for apnea, cough, choking, chest tightness, wheezing and stridor.    Cardiovascular: Positive for leg swelling. Negative for chest pain and palpitations.   Gastrointestinal: Positive for abdominal distention, abdominal pain and constipation. Negative  for blood in stool, diarrhea, nausea and vomiting.   Endocrine: Negative for cold intolerance, heat intolerance, polydipsia, polyphagia and polyuria.   Genitourinary: Negative for dysuria, flank pain and hematuria.   Musculoskeletal: Negative for arthralgias, back pain, myalgias and neck pain.   Skin: Negative for rash and wound.   Allergic/Immunologic: Negative for immunocompromised state.   Neurological: Negative for dizziness, seizures, syncope, speech difficulty, weakness, light-headedness, numbness and headaches.   Hematological: Does not bruise/bleed easily.   Psychiatric/Behavioral: Negative for agitation, behavioral problems, confusion, decreased concentration, hallucinations, self-injury and suicidal ideas. The patient is not nervous/anxious.       Otherwise complete ROS is negative except as mentioned above.    Physical Exam:   Temp:  [98.6 °F (37 °C)-99.2 °F (37.3 °C)] 98.8 °F (37.1 °C)  Heart Rate:  [] 87  Resp:  [18-20] 18  BP: (124-146)/(70-97) 124/70     Physical Exam   Constitutional: He is oriented to person, place, and time. He appears well-developed and well-nourished.   HENT:   Head: Normocephalic and atraumatic.   Nose: Nose normal.   Mouth/Throat: Oropharynx is clear and moist.   Eyes: Conjunctivae, EOM and lids are normal. Pupils are equal, round, and reactive to light. No scleral icterus.   Neck: Normal range of motion. Neck supple. No JVD present. No tracheal tenderness and no spinous process tenderness present. No rigidity. No tracheal deviation, no edema and normal range of motion present.   Cardiovascular: Normal rate, regular rhythm, S1 normal, S2 normal, normal heart sounds and normal pulses.  Exam reveals no gallop and no friction rub.    No murmur heard.  Pulmonary/Chest: Effort normal and breath sounds normal. No accessory muscle usage. No respiratory distress. He has no decreased breath sounds. He has no wheezes. He has no rales. He exhibits no tenderness.   Abdominal: Soft.  He exhibits distension (massive), fluid wave and ascites. He exhibits no mass. Bowel sounds are decreased. There is generalized tenderness. There is no rebound and no guarding.   Musculoskeletal: He exhibits edema. He exhibits no tenderness.   Neurological: He is alert and oriented to person, place, and time. He has normal reflexes. He displays no atrophy and normal reflexes. No cranial nerve deficit or sensory deficit. He exhibits normal muscle tone. He displays no seizure activity. Coordination normal.   Skin: Skin is warm. No rash noted. No pallor.   Psychiatric: He has a normal mood and affect. His behavior is normal. Judgment and thought content normal.         Results Reviewed:  I have personally reviewed current lab, radiology, and data and agree with results.  Lab Results (last 24 hours)     Procedure Component Value Units Date/Time    CBC & Differential [[id number]] Collected:  01/12/18 0129    Specimen:  Blood Updated:  01/12/18 0139    Narrative:       The following orders were created for panel order CBC & Differential.  Procedure                               Abnormality         Status                     ---------                               -----------         ------                     CBC Auto Differential[[id number]]        Abnormal            Final result                 Please view results for these tests on the individual orders.    CBC Auto Differential [[id number]]  (Abnormal) Collected:  01/12/18 0129    Specimen:  Blood Updated:  01/12/18 0139     WBC 3.61 10*3/mm3      RBC 3.20 (L) 10*6/mm3      Hemoglobin 9.9 (L) g/dL      Hematocrit 28.6 (L) %      MCV 89.4 fL      MCH 30.9 pg      MCHC 34.6 g/dL      RDW 14.6 (H) %      RDW-SD 48.1 (H) fl      MPV 9.7 fL      Platelets 127 (L) 10*3/mm3      Neutrophil % 62.3 %      Lymphocyte % 27.7 %      Monocyte % 8.3 %      Eosinophil % 0.8 %      Basophil % 0.6 %      Immature Grans % 0.3 %      Neutrophils, Absolute 2.25 10*3/mm3      Lymphocytes,  Absolute 1.00 10*3/mm3      Monocytes, Absolute 0.30 10*3/mm3      Eosinophils, Absolute 0.03 10*3/mm3      Basophils, Absolute 0.02 10*3/mm3      Immature Grans, Absolute 0.01 10*3/mm3     Urinalysis With / Culture If Indicated - Urine, Clean Catch [[id number]]  (Normal) Collected:  01/12/18 0140    Specimen:  Urine from Urine, Clean Catch Updated:  01/12/18 0153     Color, UA Yellow     Appearance, UA Clear     pH, UA 6.0     Specific Gravity, UA 1.017     Glucose, UA Negative     Ketones, UA Negative     Bilirubin, UA Negative     Blood, UA Negative     Protein, UA Negative     Leuk Esterase, UA Negative     Nitrite, UA Negative     Urobilinogen, UA 1.0 E.U./dL    Narrative:       Urine microscopic not indicated.    Comprehensive Metabolic Panel [[id number]]  (Abnormal) Collected:  01/12/18 0129    Specimen:  Blood Updated:  01/12/18 0155     Glucose 86 mg/dL      BUN 8 mg/dL      Creatinine 0.92 mg/dL      Sodium 137 mmol/L      Potassium 3.4 (L) mmol/L      Chloride 104 mmol/L      CO2 21.0 (L) mmol/L      Calcium 8.0 (L) mg/dL      Total Protein 6.0 (L) g/dL      Albumin 3.10 (L) g/dL      ALT (SGPT) 26 U/L      AST (SGOT) 30 U/L      Alkaline Phosphatase 91 U/L      Total Bilirubin 0.8 mg/dL      eGFR Non African Amer 93 mL/min/1.73      Globulin 2.9 gm/dL      A/G Ratio 1.1 g/dL      BUN/Creatinine Ratio 8.7     Anion Gap 12.0 mmol/L     Protime-INR [[id number]]  (Abnormal) Collected:  01/12/18 0129    Specimen:  Blood Updated:  01/12/18 0156     Protime 16.2 (H) Seconds      INR 1.30 (H)    Narrative:       Therapeutic range for most indications is 2.0-3.0 INR,  or 2.5-3.5 for mechanical heart valves.    aPTT [[id number]]  (Normal) Collected:  01/12/18 0129    Specimen:  Blood Updated:  01/12/18 0157     PTT 38.3 seconds     Narrative:       The recommended Heparin therapeutic range is 68-97 seconds.    Lipase [[id number]]  (Abnormal) Collected:  01/12/18 0129    Specimen:  Blood Updated:  01/12/18 021      Lipase <10 (L) U/L     Ammonia [[id number]]  (Abnormal) Collected:  01/12/18 0216    Specimen:  Blood Updated:  01/12/18 0230     Ammonia <9 (L) umol/L     Extra Tubes [[id number]] Collected:  01/12/18 0129    Specimen:  Blood from Blood, Venous Line Updated:  01/12/18 0231    Narrative:       The following orders were created for panel order Extra Tubes.  Procedure                               Abnormality         Status                     ---------                               -----------         ------                     Gold Top - SST[[id number]]                                   Final result                 Please view results for these tests on the individual orders.    Gold Top - SST [[id number]] Collected:  01/12/18 0129    Specimen:  Blood Updated:  01/12/18 0231     Extra Tube Hold for add-ons.      Auto resulted.       CBC & Differential [[id number]] Collected:  01/12/18 0700    Specimen:  Blood Updated:  01/12/18 0734    Narrative:       The following orders were created for panel order CBC & Differential.  Procedure                               Abnormality         Status                     ---------                               -----------         ------                     CBC Auto Differential[[id number]]        Abnormal            Final result                 Please view results for these tests on the individual orders.    CBC Auto Differential [[id number]]  (Abnormal) Collected:  01/12/18 0700    Specimen:  Blood Updated:  01/12/18 0734     WBC 3.51 10*3/mm3      RBC 3.12 (L) 10*6/mm3      Hemoglobin 9.8 (L) g/dL      Hematocrit 28.1 (L) %      MCV 90.1 fL      MCH 31.4 pg      MCHC 34.9 g/dL      RDW 14.7 (H) %      RDW-SD 48.4 (H) fl      MPV 10.1 fL      Platelets 126 (L) 10*3/mm3      Neutrophil % 49.3 %      Lymphocyte % 39.9 %      Monocyte % 9.1 %      Eosinophil % 1.1 %      Basophil % 0.6 %      Immature Grans % 0.0 %      Neutrophils, Absolute 1.73 (L) 10*3/mm3      Lymphocytes, Absolute  1.40 10*3/mm3      Monocytes, Absolute 0.32 10*3/mm3      Eosinophils, Absolute 0.04 10*3/mm3      Basophils, Absolute 0.02 10*3/mm3      Immature Grans, Absolute 0.00 10*3/mm3     Basic Metabolic Panel [[id number]]  (Abnormal) Collected:  01/12/18 0700    Specimen:  Blood Updated:  01/12/18 0739     Glucose 88 mg/dL      BUN 8 mg/dL      Creatinine 0.93 mg/dL      Sodium 136 (L) mmol/L      Potassium 3.9 mmol/L      Chloride 106 mmol/L      CO2 25.0 mmol/L      Calcium 8.5 mg/dL      eGFR Non African Amer 91 mL/min/1.73      BUN/Creatinine Ratio 8.6     Anion Gap 5.0 mmol/L         Imaging Results (last 24 hours)     Procedure Component Value Units Date/Time    XR Chest 1 View [[id number]] Collected:  01/12/18 0149     Updated:  01/12/18 0214    Narrative:       Exam: AP portable chest    INDICATION: Shortness of breath    FINDINGS: AP portable chest. There is a fixation plate in the  left humerus. The lungs are hypoinflated which accentuates heart  size and bronchovascular markings. Heart size normal. There is  atelectasis and/or infiltrate in the right lower lung. Mild  atelectasis in left lower lung.      Impression:       Atelectasis and/or infiltrate in the right lower  lung. Recommend follow-up..    Electronically signed by:  Kobe Easley MD  1/12/2018 2:13 AM  CST Workstation: [id number]            Assessment:    Hospital Problem List     Ascites due to alcoholic cirrhosis                Plan:  1.  Massive ascites:  Therapeutic paracentesis.  Will check labs on fluid, but no stigmata of infection.  2.  Cirrhosis:  It seems he is only on lactulose at home, and doesn't follow up with GI.  Medications need to be adjusted and he needs to follow with GI on discharge.  3.  Tobacco abuse:  Cessation counseling done.  4.  DVT Prophylaxis:  SCDs.    I discussed the patients findings and my recommendations with: patient        This document has been electronically signed by Aamir Dooley MD on January 12,  " 9:34 AM                     Electronically signed by Aamir Dooley MD at 2018  9:54 AM        [address] [place] [address]  Phone:  [phone]  Fax:   Date Ordered: 2018         Patient:  Jung Cadet MRN:  [id number]   [address] [place] [id number] :  1980  SSN:    Phone: [phone] Sex:  M     Weight: 87.4 kg (192 lb 11.2 oz)         Ht Readings from Last 1 Encounters:   18 182.9 cm (72\")         Walker                         (Order ID: [id number])    Diagnosis:  Ascites due to alcoholic cirrhosis (K70.31 [ICD-10-CM] 571.2 [ICD-9-CM])  Shortness of breath (R06.02 [ICD-10-CM] 786.05 [ICD-9-CM])  Impaired gait and mobility (R26.89 [ICD-10-CM] 781.2 [ICD-9-CM])   Quantity:  1     Equipment:  Walker Folding with Wheels  Length of Need (99 Months = Lifetime): 99 Months = Lifetime            Authorizing Provider:Chuck Barnett MD  Authorizing Provider's NPI: [id number]  Order Entered By: Chuck Barnett MD 2018 11:26 AM     Electronically signed by: Chuck Barnett MD 2018 11:26 AM         [address] [address]  Phone:  [phone]  Fax:   Date Ordered: 2018         Patient:  Jung Cadet MRN:  [id number]   [address] [place] [address] :  1980  SSN:    Phone: [phone] Sex:  M     Weight: 87.4 kg (192 lb 11.2 oz)         Ht Readings from Last 1 Encounters:   18 182.9 cm (72\")         Miscellaneous DME             (Order ID: [id number])    Diagnosis:  Ascites due to alcoholic cirrhosis (K70.31 [ICD-10-CM] 571.2 [ICD-9-CM])  Shortness of breath (R06.02 [ICD-10-CM] 786.05 [ICD-9-CM])  Impaired gait and mobility (R26.89 [ICD-10-CM] 781.2 [ICD-9-CM])   Quantity:  1     Type of DME: shower chair  Length of Need (99 Months = Lifetime): 99 Months = Lifetime            Authorizing Provider:Chuck Barnett MD  Authorizing Provider's " NPI: [id number]  Order Entered By: Chuck Barnett MD 1/16/2018 11:26 AM     Electronically signed by: Chuck Barnett MD 1/16/2018 11:26 AM

## 2018-01-16 NOTE — PLAN OF CARE
Problem: Patient Care Overview (Adult)  Goal: Plan of Care Review  Outcome: Ongoing (interventions implemented as appropriate)   01/15/18 1839   Patient Care Overview   Progress no change   Outcome Evaluation   Outcome Summary/Follow up Plan Pt reporting high level of abdominal pain; Another 6 L of fluid removed from abdomen; albumin regiment continued   Coping/Psychosocial Response Interventions   Plan Of Care Reviewed With patient     Goal: Adult Individualization and Mutuality  Outcome: Ongoing (interventions implemented as appropriate)    Goal: Discharge Needs Assessment  Outcome: Ongoing (interventions implemented as appropriate)      Problem: Pain, Acute (Adult)  Goal: Identify Related Risk Factors and Signs and Symptoms  Outcome: Outcome(s) achieved Date Met: 01/15/18    Goal: Acceptable Pain Control/Comfort Level  Outcome: Ongoing (interventions implemented as appropriate)      Problem: Fluid Volume Excess (Adult,Obstetrics,Pediatric)  Goal: Identify Related Risk Factors and Signs and Symptoms  Outcome: Outcome(s) achieved Date Met: 01/15/18    Goal: Stable Weight  Outcome: Ongoing (interventions implemented as appropriate)    Goal: Balanced Intake/Output  Outcome: Ongoing (interventions implemented as appropriate)      Problem: Liver Failure, Acute/Chronic (Adult)  Goal: Signs and Symptoms of Listed Potential Problems Will be Absent or Manageable (Liver Failure, Acute/Chronic)  Outcome: Ongoing (interventions implemented as appropriate)

## 2018-01-16 NOTE — PLAN OF CARE
Problem: Patient Care Overview (Adult)  Goal: Plan of Care Review  Outcome: Ongoing (interventions implemented as appropriate)   01/16/18 0135   Patient Care Overview   Progress no change   Outcome Evaluation   Outcome Summary/Follow up Plan Pt has complained of pain, oxycodone given. Pt requested more pain meds, MD said no to extra pain meds. Vitals stable. will continue to monitor.    Coping/Psychosocial Response Interventions   Plan Of Care Reviewed With patient     Goal: Adult Individualization and Mutuality  Outcome: Ongoing (interventions implemented as appropriate)    Goal: Discharge Needs Assessment  Outcome: Ongoing (interventions implemented as appropriate)      Problem: Pain, Acute (Adult)  Goal: Acceptable Pain Control/Comfort Level  Outcome: Ongoing (interventions implemented as appropriate)      Problem: Fluid Volume Excess (Adult,Obstetrics,Pediatric)  Goal: Stable Weight  Outcome: Ongoing (interventions implemented as appropriate)    Goal: Balanced Intake/Output  Outcome: Ongoing (interventions implemented as appropriate)      Problem: Liver Failure, Acute/Chronic (Adult)  Goal: Signs and Symptoms of Listed Potential Problems Will be Absent or Manageable (Liver Failure, Acute/Chronic)  Outcome: Ongoing (interventions implemented as appropriate)

## 2018-01-16 NOTE — DISCHARGE SUMMARY
[facility] Medicine Services  DISCHARGE SUMMARY       Date of Admission: 1/12/2018  Date of Discharge:  1/16/2018  Primary Care Physician: Walt Costello Jr, MD    Presenting Problem/History of Present Illness:  Shortness of breath [R06.02]  Ascites due to alcoholic cirrhosis [K70.31]  Ascites due to alcoholic cirrhosis [K70.31]     Final Discharge Diagnoses:  Hospital Problem List     Ascites due to alcoholic cirrhosis          Consults:   Consults     Date and Time Order Name Status Description    1/12/2018 0230 Hospitalist (on-call MD unless specified)            Procedures Performed:                 Pertinent Test Results:    Laboratory Data:     Results from last 7 days  Lab Units 01/16/18  0436 01/15/18  0519 01/14/18  0507 01/13/18  1050  01/12/18  0129   SODIUM mmol/L 135* 136* 138 138  < > 137   POTASSIUM mmol/L 4.0 3.8 3.8 3.8  < > 3.4*   CHLORIDE mmol/L 103 103 102 104  < > 104   CO2 mmol/L 27.0 26.0 28.0 27.0  < > 21.0*   BUN mg/dL 12 11 11 8  < > 8   CREATININE mg/dL 1.21 1.20 1.32* 1.19  < > 0.92   GLUCOSE mg/dL 104* 92 101* 99  < > 86   CALCIUM mg/dL 8.4 8.2* 8.1* 8.0*  < > 8.0*   BILIRUBIN mg/dL  --   --   --  0.5  0.5  --  0.8   ALK PHOS U/L  --   --   --  76  --  91   ALT (SGPT) U/L  --   --   --  26  --  26   AST (SGOT) U/L  --   --   --  28  --  30   ANION GAP mmol/L 5.0 7.0 8.0 7.0  < > 12.0   < > = values in this interval not displayed.  Estimated Creatinine Clearance: 103.3 mL/min (by C-G formula based on Cr of 1.21).            Results from last 7 days  Lab Units 01/16/18  0436 01/15/18  0519 01/14/18  0507 01/13/18  0434 01/12/18  0700   WBC 10*3/mm3 3.79 3.28 3.46 3.37 3.51   HEMOGLOBIN g/dL 11.1* 10.5* 10.6* 10.8* 9.8*   HEMATOCRIT % 32.3* 29.9* 31.2* 31.6* 28.1*   PLATELETS 10*3/mm3 148* 128* 142* 148* 126*       Results from last 7 days  Lab Units 01/12/18  0129   INR  1.30*       Culture Data:   No results found for: BLOODCX  No results found  for: URINECX  No results found for: RESPCX  No results found for: WOUNDCX  No results found for: STOOLCX  No components found for: BODYFLD    Micobiology                            Radiology Data:   Imaging Results (all)     Procedure Component Value Units Date/Time    XR Chest 1 View [[id number]] Collected:  01/12/18 0149     Updated:  01/12/18 0214    Narrative:       Exam: AP portable chest    INDICATION: Shortness of breath    FINDINGS: AP portable chest. There is a fixation plate in the  left humerus. The lungs are hypoinflated which accentuates heart  size and bronchovascular markings. Heart size normal. There is  atelectasis and/or infiltrate in the right lower lung. Mild  atelectasis in left lower lung.      Impression:       Atelectasis and/or infiltrate in the right lower  lung. Recommend follow-up..    Electronically signed by:  Kobe Easley MD  1/12/2018 2:13 AM  CST Workstation: [email or web address]    US Paracentesis [[id number]] Collected:  01/12/18 1011    Specimen:  Body Fluid Updated:  01/12/18 1144    Narrative:         PROCEDURE: Ultrasound-guided paracentesis    COMPARISON: None    HISTORY: Ascites    TECHNIQUE:  The risks, benefits and alternatives were explained to the  patient who had ample opportunity to ask questions. The patient  agreed to proceed and informed consent was obtained.    An adequate fluid pocket was identified in the left lower  quadrant. The site was marked then prepped and draped in sterile  fashion. Approximately 5 mL of 2% lidocaine solution was used for  local superficial and deep anesthesia. A 5 Czech Yueh needle was  inserted into the peritoneal cavity under sonographic guidance.    FINDINGS:   Approximately 10.8 L of clear, serous fluid was removed. There  were no immediate post procedure complications.      Impression:       CONCLUSION:    Successful ultrasound-guided paracentesis, as described above.    Electronically signed by:  Adelfo Escobar MD  1/12/2018 11:28 AM  CST  Workstation: [id number]    XR Chest 1 View [[id number]] Collected:  01/12/18 1142     Updated:  01/12/18 1210    Narrative:         PROCEDURE: Single chest view AP    REASON FOR EXAM:dyspnea, K70.31 Alcoholic cirrhosis of liver with  ascites R06.02 Shortness of breath    FINDINGS: Comparison exam dated January 12, 2018. Cardiac and  pulmonary vasculature are normal. Improvement in right lung base  patchy opacity with improved aeration in this region. Otherwise  lungs are clear. Very small right pleural effusion along right  minor fissure. No acute osseous abnormality.      Impression:       1.  Improvement in right lung base patchy opacity with improved  aeration suggesting resolving atelectasis and/or pneumonia.  2.  Very small right pleural effusion along right minor fissure.    Electronically signed by:  Ravi Hughes MD  1/12/2018 12:08 PM CST  Workstation: [id number]    US Paracentesis [[id number]] Collected:  01/15/18 1348    Specimen:  Body Fluid Updated:  01/15/18 1448    Narrative:         PROCEDURE: Ultrasound-guided paracentesis    COMPARISON: 1/12/2018    HISTORY: Ascites    TECHNIQUE:  The risks, benefits and alternatives were explained to the  patient who had ample opportunity to ask questions. The patient  agreed to proceed and informed consent was obtained.    An adequate fluid pocket was identified in the right lower  quadrant. The site was marked then prepped and draped in sterile  fashion. Approximately 10 mL of 2% lidocaine solution was used  for local superficial and deep anesthesia. A 5 Turkish Yueh needle  was inserted into the peritoneal cavity under sonographic  guidance.    FINDINGS:   Approximately 6000 mL of clear, serous fluid was removed. There  were no immediate post procedure complications.      Impression:       CONCLUSION:    Successful ultrasound-guided paracentesis, as described above.     Electronically signed by:  Adelfo Escobar MD  1/15/2018 2:47 PM CST  Workstation: [id number]          Chief  Complaint on Day of Discharge: none    HPI:Patient has a history of cirrhosis that was diagnosed in May of 2017.  He was treated at [facility] and required transfer to [place] for higher level of care.  While in [place], he underwent hemodialysis for hepatorenal syndrome.  He improved and went to SNF for rehab.  He went home from SNF in November.  He states that on December 18 he had paracentesis with 7 liters of fluid removed.  He feels that all of the fluid has reaccumulated.  He also feels swelling in his legs.  He is currently having significant pain in his abdomen.  He is somewhat short of breath.  He states that he is completely sober, and hasn't had any alcohol in over a year.  He states that he didn't feel like all of the fluid was removed initially.  There are no alleviating or exacerbating factors.                                                                         Hospital Course: Patient admitted, for massive ascites , had therapeutic paracentesis x 2 ( a total of 16.8L removed ), patient had 4 25g albumin total during his stay . Cr 1.2 on discharge . Patient significantly improved and respiratory symptoms resolved, patient felt much better and tolerated diet and ambulated well.  discharged on nadolol, spirolactone, lasix, lactulose, fluid and Na restriction, no alcohol intake, follow up with Dr Costello 1/23/18 . Patient has johan to be established with GI Dr Sandoval on 1/31/18 , clinic closed today, patient advised to call tomorrow and reschedule johan for 5-7 days from today . At time of discharge patient tolerated diet and was asymptomatic, exam unremarkable except for mild abd distention ( significantly improved since admission)    Condition on Discharge:  Improved and Stable    Physical Exam on Discharge:  Temp:  [97.5 °F (36.4 °C)-98.8 °F (37.1 °C)] 97.6 °F (36.4 °C)  Heart Rate:  [74-97] 97  Resp:  [18] 18  BP: (104-124)/(57-80) 107/69    Constitutional: Patient is AAO x 3. Patient  appears well-developed and well-nourished.   Cardiovascular: Normal rate, regular rhythm, normal heart sounds and intact distal pulses.  Exam reveals no gallop and no friction rub.  No murmur heard.  Pulmonary/Chest: Effort normal and breath sounds normal. No respiratory distress. No wheezes, rales or ronchi.  Abdominal: Soft. Bowel sounds are normal. Mild distension( significantly improved) , no tenderness. There is no rebound and no guarding. No hernia.   Musculoskeletal: No Cyanosis clubbing or edema.    Discharge Disposition:  Home or Self Care    Discharge Medications:   Jung Cadet   Home Medication Instructions AMY:[id number]    Printed on:01/16/18 0282   Medication Information                      furosemide (LASIX) 20 MG tablet  Take 1 tablet by mouth 2 (Two) Times a Day for 30 days.             lactulose (CHRONULAC) 10 GM/15ML solution  Take 20 g by mouth 2 (Two) Times a Day.             nadolol (CORGARD) 20 MG tablet  Take 1 tablet by mouth Daily for 30 days.             pantoprazole (PROTONIX) 40 MG EC tablet  Take 1 tablet by mouth 2 (Two) Times a Day Before Meals for 15 days.             QUEtiapine XR (SEROquel XR) 400 MG 24 hr tablet  Take 400 mg by mouth Every Night.             spironolactone (ALDACTONE) 25 MG tablet  Take 1 tablet by mouth Daily for 30 days.                 Discharge Diet:   Diet Instructions     Diet: Regular       Discharge Diet:  Regular   Fluid Restriction per day:  1500 mL Fluid   Na restrcition 2G daily ,   No alcohol intake .                 Activity at Discharge:   Activity Instructions     Activity as Tolerated                     All the abnormal findings were discussed with the patient in details and the patient verbalized understanding of needing to follow up with PCP and and other specialities of  outpatient follow up for further evaluation and management.    Discharge Care Plan/Instructions: follow up with PCP in 2-3 days. GI in 5-7 days    Follow-up  Appointments:   Future Appointments  Date Time Provider Department Center   1/23/2018 9:00 AM Walt Costello Jr., MD [facility] None   1/31/2018 3:00 PM Asim Easley PA-C [facility] None       Test Results Pending at Discharge:  Order Current Status    Body Fluid Culture - Body Fluid, Peritoneum Preliminary result           This document has been electronically signed by Chuck Barnett MD on January 16, 2018 10:53 AM

## 2018-01-17 LAB
BACTERIA FLD CULT: NORMAL
GRAM STN SPEC: NORMAL
GRAM STN SPEC: NORMAL

## 2018-01-18 ENCOUNTER — OFFICE VISIT (OUTPATIENT)
Dept: GASTROENTEROLOGY | Facility: CLINIC | Age: 38
End: 2018-01-18

## 2018-01-18 VITALS
SYSTOLIC BLOOD PRESSURE: 122 MMHG | HEART RATE: 117 BPM | BODY MASS INDEX: 28.04 KG/M2 | WEIGHT: 207 LBS | OXYGEN SATURATION: 98 % | DIASTOLIC BLOOD PRESSURE: 78 MMHG | HEIGHT: 72 IN

## 2018-01-18 DIAGNOSIS — K70.31 ALCOHOLIC CIRRHOSIS OF LIVER WITH ASCITES (HCC): Primary | ICD-10-CM

## 2018-01-18 PROCEDURE — 99214 OFFICE O/P EST MOD 30 MIN: CPT | Performed by: INTERNAL MEDICINE

## 2018-01-18 RX ORDER — SPIRONOLACTONE 25 MG/1
100 TABLET ORAL DAILY
Qty: 120 TABLET | Refills: 0 | Status: SHIPPED | OUTPATIENT
Start: 2018-01-18 | End: 2018-02-17

## 2018-01-18 RX ORDER — FUROSEMIDE 20 MG/1
40 TABLET ORAL 2 TIMES DAILY
Qty: 120 TABLET | Refills: 0 | Status: SHIPPED | OUTPATIENT
Start: 2018-01-18 | End: 2018-02-17

## 2018-01-18 NOTE — PROGRESS NOTES
[facility]      Chief Complaint:   Chief Complaint   Patient presents with   • Cirrhosis   • Abdominal Pain       Subjective     HPI:   Patient with end-stage liver disease secondary to EtOH.  Patient last drink was last May.  Patient is no longer drinking alcohol but continues to have worsening ascites.  Patient had 17 L of fluid removed on Monday but states that he is again uncomfortable by the amount of fluid in his abdomen.  Patient had a large amount of salt this morning and eats a large amount of cans of soup with high salt and sodium content.  Discussed with patient importance of decreasing his salt intake and decreasing his fluid intake.    Plan; we'll increase patient's Lasix to 40 mg twice a day we'll also increase his Aldactone to 100 mg daily.  The patient follow up with Asim in 4 weeks see if any improvement in symptoms discussed patient importance of not consuming this high sodium high fluid diet.  Patient may need evaluation in [place] for possible liver transplant.    Past Medical History:   Past Medical History:   Diagnosis Date   • Abdominal pain    • Acute renal failure syndrome    • Backache      Chronic      • Bronchitis    • Chronic pain    • Cirrhosis    • Degenerative joint disease involving multiple joints    • Drug abuse     History of opiate withdrawal      • Fracture of multiple bones of upper limb    • Generalized anxiety disorder    • Hypercalcemia    • Insomnia    • Multiple fractures of lower leg     6 places      • Open fracture of humerus    • Open fracture of humerus    • Plantar fasciitis     Versus meuropathic or fibromyalgia type pain in feet      • Renal disorder    • Tobacco dependence syndrome    • Verruca vulgaris        Family History:  History reviewed. No pertinent family history.    Social History:   reports that he has been smoking Cigarettes.  He has been smoking about 1.00 pack per day. He has never used smokeless tobacco. He reports that  he does not drink alcohol or use illicit drugs.    Medications:   Prior to Admission medications    Medication Sig Start Date End Date Taking? Authorizing Provider   furosemide (LASIX) 20 MG tablet Take 2 tablets by mouth 2 (Two) Times a Day for 30 days. 1/18/18 2/17/18 Yes Walt Sandoval MD   Hydrocodone-Acetaminophen (LORTAB 10)  MG per tablet Take 1 tablet by mouth Every 6 (Six) Hours As Needed for Moderate Pain .   Yes Historical Provider, MD   ibuprofen (ADVIL,MOTRIN) 800 MG tablet Take 800 mg by mouth Every 6 (Six) Hours As Needed for Mild Pain .   Yes Historical Provider, MD   lactulose (CHRONULAC) 10 GM/15ML solution Take 20 g by mouth 2 (Two) Times a Day. 12/11/17  Yes Historical Provider, MD   nadolol (CORGARD) 20 MG tablet Take 1 tablet by mouth Daily for 30 days. 1/17/18 2/16/18 Yes Chuck Barnett MD   omeprazole (priLOSEC) 40 MG capsule Take 40 mg by mouth Daily.   Yes Historical Provider, MD   pantoprazole (PROTONIX) 40 MG EC tablet Take 1 tablet by mouth 2 (Two) Times a Day Before Meals for 15 days. 1/16/18 1/31/18 Yes Chuck Barnett MD   pregabalin (LYRICA) 300 MG capsule Take 300 mg by mouth 2 (Two) Times a Day.   Yes Historical Provider, MD   QUEtiapine XR (SEROquel XR) 400 MG 24 hr tablet Take 400 mg by mouth Every Night.   Yes Historical Provider, MD   spironolactone (ALDACTONE) 25 MG tablet Take 4 tablets by mouth Daily for 30 days. 1/18/18 2/17/18 Yes Walt Sandoval MD   furosemide (LASIX) 20 MG tablet Take 1 tablet by mouth 2 (Two) Times a Day for 30 days. 1/16/18 1/18/18 Yes Chuck Barnett MD   spironolactone (ALDACTONE) 25 MG tablet Take 1 tablet by mouth Daily for 30 days. 1/17/18 1/18/18 Yes Chuck Barnett MD       Allergies:  Review of patient's allergies indicates no known allergies.    ROS:    Review of Systems   Constitutional: Negative for activity change, appetite change, chills, diaphoresis, fatigue, fever and unexpected weight change.   MICHELLE: Negative for sore throat and  "trouble swallowing.    Respiratory: Negative for shortness of breath.    Gastrointestinal: Positive for abdominal distention and abdominal pain. Negative for anal bleeding, blood in stool, constipation, diarrhea, nausea, rectal pain and vomiting.   Musculoskeletal: Negative for arthralgias.   Skin: Negative for pallor.   Neurological: Negative for light-headedness.     Objective     Blood pressure 122/78, pulse 117, height 182.9 cm (72\"), weight 93.9 kg (207 lb), SpO2 98 %.    Physical Exam   Constitutional: He is oriented to person, place, and time. He appears well-developed and well-nourished. No distress.   HENT:   Head: Normocephalic and atraumatic.   Cardiovascular: Normal rate, regular rhythm, normal heart sounds and intact distal pulses.  Exam reveals no gallop and no friction rub.    No murmur heard.  Pulmonary/Chest: Breath sounds normal. No respiratory distress. He has no wheezes. He has no rales. He exhibits no tenderness.   Abdominal: Soft. Bowel sounds are normal. He exhibits distension. He exhibits no mass. There is tenderness. There is no rebound and no guarding. No hernia.   Musculoskeletal: Normal range of motion. He exhibits no edema.   Neurological: He is alert and oriented to person, place, and time.   Skin: Skin is warm and dry. No rash noted. He is not diaphoretic. No erythema. No pallor.   Psychiatric: He has a normal mood and affect. His behavior is normal. Judgment and thought content normal.        Assessment/Plan   Jung was seen today for cirrhosis and abdominal pain.    Diagnoses and all orders for this visit:    Alcoholic cirrhosis of liver with ascites    Other orders  -     furosemide (LASIX) 20 MG tablet; Take 2 tablets by mouth 2 (Two) Times a Day for 30 days.  -     spironolactone (ALDACTONE) 25 MG tablet; Take 4 tablets by mouth Daily for 30 days.        * Surgery not found *     Diagnosis Plan   1. Alcoholic cirrhosis of liver with ascites         Anticipated Surgical " Procedure:  No orders of the defined types were placed in this encounter.      The risks, benefits, and alternatives of this procedure have been discussed with the patient or the responsible party- the patient understands and agrees to proceed.

## 2018-02-20 ENCOUNTER — OFFICE VISIT (OUTPATIENT)
Dept: GASTROENTEROLOGY | Facility: CLINIC | Age: 38
End: 2018-02-20

## 2018-02-20 VITALS
HEIGHT: 72 IN | WEIGHT: 164 LBS | DIASTOLIC BLOOD PRESSURE: 72 MMHG | HEART RATE: 92 BPM | BODY MASS INDEX: 22.21 KG/M2 | SYSTOLIC BLOOD PRESSURE: 122 MMHG

## 2018-02-20 DIAGNOSIS — R11.0 NAUSEA: ICD-10-CM

## 2018-02-20 DIAGNOSIS — K70.31 ALCOHOLIC CIRRHOSIS OF LIVER WITH ASCITES (HCC): Primary | ICD-10-CM

## 2018-02-20 DIAGNOSIS — K70.11 ASCITES DUE TO ALCOHOLIC HEPATITIS: ICD-10-CM

## 2018-02-20 DIAGNOSIS — R10.10 PAIN OF UPPER ABDOMEN: ICD-10-CM

## 2018-02-20 PROCEDURE — 99214 OFFICE O/P EST MOD 30 MIN: CPT | Performed by: PHYSICIAN ASSISTANT

## 2018-02-20 RX ORDER — SPIRONOLACTONE 25 MG/1
100 TABLET ORAL DAILY
COMMUNITY
End: 2018-03-19 | Stop reason: SDUPTHER

## 2018-02-20 RX ORDER — FUROSEMIDE 20 MG/1
40 TABLET ORAL 2 TIMES DAILY
COMMUNITY
End: 2018-03-19 | Stop reason: SDUPTHER

## 2018-02-20 NOTE — PROGRESS NOTES
Chief Complaint   Patient presents with   • End Stage Liver Disease       ENDO PROCEDURE ORDERED:    Subjective    Jung Cadet is a 37 y.o. male. he is here today for follow-up.    History of Present Illness    Patient is seen on a recheck of his end-stage liver disease and alcoholic cirrhosis.  Last saw Dr. Montilla on 01/18/2018.  Patient was accompanied by family member.  He states he initially got sick in May of last year with yellow jaundice.  Told he had alcoholic hepatitis.  He was on dialysis.  He was hospitalized at [facility] from May to October.  Apparently, he was seen by Dr. Sánchez.  He has had no alcohol since that time.  He has not really followed up in [place] either.  He had had an EGD previously on 10/01/2015 showing a gastritis with negative biopsies.  He did not have another endoscopy since he first got sick.  He presents with 7 out of 10 abdominal pain.  He is having a lot of cramping in the abdomen as well as nausea, but no vomiting.  No dysphagia.  Bowel movements are regular without blood or mucus.  Weight is down 43 pounds since he last saw Dr. Montilla.  He is on Lasix 40 mg b.i.d., Aldactone 50 mg b.i.d.      Patient was hospitalized on 01/12/2018 to 01/16/2018 with shortness of breath and ascites.  He had an ultrasound paracentesis of 10.8 L removed on 01/12/2018; this was negative for malignant cells.  He had another 6 L removed on 01/15/2018.  Chest x-ray showed infiltrates in the right lower lobe.  Normal ammonia, urinalysis, and lipase.  INR 1.3.  Laboratories at the time of discharge:  BMP showed a sodium of 135, glucose of 104; otherwise normal.  CBC showed anemia with hemoglobin of 11.1, hematocrit 32.3, 148,000 platelets.            ASSESSMENT AND PLAN:  Patient with end-stage liver disease secondary to alcohol with significant ascites.      Consider TIPS.  Patient has not had imaging of his liver or evaluation of his hepatic vessels.  I have recommended  abdominal ultrasound and Doppler of his hepatic vessels.  Will check iron studies, CBC, CMP, INR, AST, hepatitis diagnostic panel, alpha-1 antitrypsin, SMA, CECY, AMA, ceruloplasmin.  He will continue current medications including the Lasix, Aldactone, and lactulose.  Will see him back in 2 weeks.  Further pending clinical course and the results of the above.              The following portions of the patient's history were reviewed and updated as appropriate:   Past Medical History:   Diagnosis Date   • Abdominal pain    • Acute renal failure syndrome    • Backache      Chronic      • Bronchitis    • Chronic pain    • Cirrhosis    • Degenerative joint disease involving multiple joints    • Drug abuse     History of opiate withdrawal      • Fracture of multiple bones of upper limb    • Generalized anxiety disorder    • Hypercalcemia    • Insomnia    • Multiple fractures of lower leg     6 places      • Open fracture of humerus    • Open fracture of humerus    • Plantar fasciitis     Versus meuropathic or fibromyalgia type pain in feet      • Renal disorder    • Tobacco dependence syndrome    • Verruca vulgaris      Past Surgical History:   Procedure Laterality Date   • ADENOIDECTOMY  01/27/1983    With bilateral tube implants   • ENDOSCOPY  11/30/2011    Normal esophagus. Gastritis in stomach. Biopsy taken. Normal duodenum. Biopsy taken.   • FIXATION DEVICE APPLICATION      Rods in R leg and Lt. arm   • MANDIBLE FRACTURE SURGERY      Jaw wired   • ORTHOPEDIC SURGERY Left     pt states titanium plates in L arm and R leg   • UPPER GASTROINTESTINAL ENDOSCOPY  11/30/2011     Family History   Problem Relation Age of Onset   • Heart attack Mother    • Heart disease Other        No Known Allergies  Social History     Social History   • Marital status: Single     Spouse name: N/A   • Number of children: N/A   • Years of education: N/A     Social History Main Topics   • Smoking status: Current Every Day Smoker     Packs/day:  "1.00     Types: Cigarettes   • Smokeless tobacco: Never Used   • Alcohol use No   • Drug use: No   • Sexual activity: Defer     Other Topics Concern   • None     Social History Narrative       Current Outpatient Prescriptions:   •  furosemide (LASIX) 20 MG tablet, Take 40 mg by mouth 2 (Two) Times a Day., Disp: , Rfl:   •  lactulose (CHRONULAC) 10 GM/15ML solution, Take 20 g by mouth 2 (Two) Times a Day., Disp: , Rfl:   •  spironolactone (ALDACTONE) 25 MG tablet, Take 100 mg by mouth Daily., Disp: , Rfl:   Review of Systems  Review of Systems       Objective    /72 (BP Location: Right arm)  Pulse 92  Ht 182.9 cm (72.01\")  Wt 74.4 kg (164 lb)  BMI 22.24 kg/m2  Physical Exam   Constitutional: He is oriented to person, place, and time. He appears well-developed and well-nourished. No distress.   HENT:   Head: Normocephalic and atraumatic.   Eyes: EOM are normal. Pupils are equal, round, and reactive to light.   Neck: Normal range of motion.   Cardiovascular: Normal rate, regular rhythm and normal heart sounds.    Pulmonary/Chest: Effort normal and breath sounds normal.   Abdominal: Soft. Bowel sounds are normal. He exhibits distension. He exhibits no shifting dullness, no abdominal bruit, no ascites and no mass. There is no hepatosplenomegaly. There is no tenderness. There is no rigidity, no rebound, no guarding and no CVA tenderness. No hernia. Hernia confirmed negative in the ventral area.   Mild diffuse, mild ascites   Musculoskeletal: Normal range of motion.   Neurological: He is alert and oriented to person, place, and time.   Skin: Skin is warm and dry.   Psychiatric: He has a normal mood and affect. His behavior is normal. Judgment and thought content normal.   Nursing note and vitals reviewed.    Assessment/Plan      1. Alcoholic cirrhosis of liver with ascites    2. Pain of upper abdomen    3. Ascites due to alcoholic hepatitis    4. Nausea    .   Jung was seen today for end stage liver " disease.    Diagnoses and all orders for this visit:    Alcoholic cirrhosis of liver with ascites  -     CBC Auto Differential  -     Comprehensive Metabolic Panel  -     Iron and TIBC  -     Ferritin  -     AFP Tumor Marker  -     Alpha - 1 - Antitrypsin  -     Anti-Smooth Muscle Antibody Titer  -     Ceruloplasmin  -     Protime-INR  -     Nuclear Antigen Antibody, IFA  -     Mitochondrial Antibodies, M2  -     Hepatitis Panel, Acute  -     Ammonia  -     US Abdomen Complete  -     US Abdominal Vascular Complete    Pain of upper abdomen  -     CBC Auto Differential  -     Comprehensive Metabolic Panel  -     Iron and TIBC  -     Ferritin  -     AFP Tumor Marker  -     Alpha - 1 - Antitrypsin  -     Anti-Smooth Muscle Antibody Titer  -     Ceruloplasmin  -     Protime-INR  -     Nuclear Antigen Antibody, IFA  -     Mitochondrial Antibodies, M2  -     Hepatitis Panel, Acute  -     Ammonia  -     US Abdomen Complete  -     US Abdominal Vascular Complete    Ascites due to alcoholic hepatitis  -     CBC Auto Differential  -     Comprehensive Metabolic Panel  -     Iron and TIBC  -     Ferritin  -     AFP Tumor Marker  -     Alpha - 1 - Antitrypsin  -     Anti-Smooth Muscle Antibody Titer  -     Ceruloplasmin  -     Protime-INR  -     Nuclear Antigen Antibody, IFA  -     Mitochondrial Antibodies, M2  -     Hepatitis Panel, Acute  -     Ammonia  -     US Abdomen Complete  -     US Abdominal Vascular Complete    Nausea  -     CBC Auto Differential  -     Comprehensive Metabolic Panel  -     Iron and TIBC  -     Ferritin  -     AFP Tumor Marker  -     Alpha - 1 - Antitrypsin  -     Anti-Smooth Muscle Antibody Titer  -     Ceruloplasmin  -     Protime-INR  -     Nuclear Antigen Antibody, IFA  -     Mitochondrial Antibodies, M2  -     Hepatitis Panel, Acute  -     Ammonia  -     US Abdomen Complete  -     US Abdominal Vascular Complete        Orders placed during this encounter include:  Orders Placed This Encounter    Procedures   • US Abdomen Complete     Order Specific Question:   Reason for Exam:     Answer:   Cirrhosis, ascites   • US Abdominal Vascular Complete     Scheduling Instructions:      Doppler Hepatic     Order Specific Question:   Reason for Exam:     Answer:   ascites   • CBC Auto Differential   • Comprehensive Metabolic Panel   • Iron and TIBC   • Ferritin   • AFP Tumor Marker   • Alpha - 1 - Antitrypsin   • Anti-Smooth Muscle Antibody Titer   • Ceruloplasmin   • Protime-INR   • Nuclear Antigen Antibody, IFA   • Mitochondrial Antibodies, M2   • Hepatitis Panel, Acute   • Ammonia       Medications prescribed:  No orders of the defined types were placed in this encounter.    Discontinued Medications       Reason for Discontinue    Hydrocodone-Acetaminophen (LORTAB 10)  MG per tablet Discontinued by another clinician    ibuprofen (ADVIL,MOTRIN) 800 MG tablet Discontinued by another clinician    omeprazole (priLOSEC) 40 MG capsule Discontinued by another clinician    pregabalin (LYRICA) 300 MG capsule Discontinued by another clinician    QUEtiapine XR (SEROquel XR) 400 MG 24 hr tablet Discontinued by another clinician        Requested Prescriptions      No prescriptions requested or ordered in this encounter       Review and/or summary of lab tests, radiology, procedures, medications. Review and summary of old records and obtaining of history. The risks and benefits of my recommendations, as well as other treatment options were discussed with the patient today. Questions were answered.    Follow-up: Return in about 2 weeks (around 3/6/2018), or if symptoms worsen or fail to improve.     * Surgery not found *      This document has been electronically signed by Asim Easley PA-C on February 22, 2018 3:04 PM      Results for orders placed or performed during the hospital encounter of 01/12/18   [facility]   Result Value Ref Range    Extra Tube Hold for add-ons.    Urinalysis With / Culture If Indicated  - Urine, Clean Catch   Result Value Ref Range    Color, UA Yellow Yellow, Straw, Dark Yellow, Steph    Appearance, UA Clear Clear    pH, UA 6.0 5.0 - 9.0    Specific Gravity, UA 1.017 1.003 - 1.030    Glucose, UA Negative Negative    Ketones, UA Negative Negative    Bilirubin, UA Negative Negative    Blood, UA Negative Negative    Protein, UA Negative Negative    Leuk Esterase, UA Negative Negative    Nitrite, UA Negative Negative    Urobilinogen, UA 1.0 E.U./dL 0.2 - 1.0 E.U./dL   CBC Auto Differential   Result Value Ref Range    WBC 3.79 3.20 - 9.80 10*3/mm3    RBC 3.63 (L) 4.37 - 5.74 10*6/mm3    Hemoglobin 11.1 (L) 13.7 - 17.3 g/dL    Hematocrit 32.3 (L) 39.0 - 49.0 %    MCV 89.0 80.0 - 98.0 fL    MCH 30.6 26.5 - 34.0 pg    MCHC 34.4 31.5 - 36.3 g/dL    RDW 14.3 11.5 - 14.5 %    RDW-SD 47.0 (H) 35.1 - 43.9 fl    MPV 10.8 8.0 - 12.0 fL    Platelets 148 (L) 150 - 450 10*3/mm3    Neutrophil % 47.7 37.0 - 80.0 %    Lymphocyte % 38.0 10.0 - 50.0 %    Monocyte % 10.8 0.0 - 12.0 %    Eosinophil % 2.9 0.0 - 7.0 %    Basophil % 0.3 0.0 - 2.0 %    Immature Grans % 0.3 0.0 - 0.5 %    Neutrophils, Absolute 1.81 (L) 2.00 - 8.60 10*3/mm3    Lymphocytes, Absolute 1.44 0.60 - 4.20 10*3/mm3    Monocytes, Absolute 0.41 0.00 - 0.90 10*3/mm3    Eosinophils, Absolute 0.11 0.00 - 0.70 10*3/mm3    Basophils, Absolute 0.01 0.00 - 0.20 10*3/mm3    Immature Grans, Absolute 0.01 0.00 - 0.02 10*3/mm3   CBC Auto Differential   Result Value Ref Range    WBC 3.28 3.20 - 9.80 10*3/mm3    RBC 3.35 (L) 4.37 - 5.74 10*6/mm3    Hemoglobin 10.5 (L) 13.7 - 17.3 g/dL    Hematocrit 29.9 (L) 39.0 - 49.0 %    MCV 89.3 80.0 - 98.0 fL    MCH 31.3 26.5 - 34.0 pg    MCHC 35.1 31.5 - 36.3 g/dL    RDW 14.3 11.5 - 14.5 %    RDW-SD 47.1 (H) 35.1 - 43.9 fl    MPV 10.4 8.0 - 12.0 fL    Platelets 128 (L) 150 - 450 10*3/mm3    Neutrophil % 38.7 37.0 - 80.0 %    Lymphocyte % 49.1 10.0 - 50.0 %    Monocyte % 9.5 0.0 - 12.0 %    Eosinophil % 1.8 0.0 - 7.0 %     Basophil % 0.6 0.0 - 2.0 %    Immature Grans % 0.3 0.0 - 0.5 %    Neutrophils, Absolute 1.27 (L) 2.00 - 8.60 10*3/mm3    Lymphocytes, Absolute 1.61 0.60 - 4.20 10*3/mm3    Monocytes, Absolute 0.31 0.00 - 0.90 10*3/mm3    Eosinophils, Absolute 0.06 0.00 - 0.70 10*3/mm3    Basophils, Absolute 0.02 0.00 - 0.20 10*3/mm3    Immature Grans, Absolute 0.01 0.00 - 0.02 10*3/mm3   CBC Auto Differential   Result Value Ref Range    WBC 3.46 3.20 - 9.80 10*3/mm3    RBC 3.47 (L) 4.37 - 5.74 10*6/mm3    Hemoglobin 10.6 (L) 13.7 - 17.3 g/dL    Hematocrit 31.2 (L) 39.0 - 49.0 %    MCV 89.9 80.0 - 98.0 fL    MCH 30.5 26.5 - 34.0 pg    MCHC 34.0 31.5 - 36.3 g/dL    RDW 14.3 11.5 - 14.5 %    RDW-SD 47.1 (H) 35.1 - 43.9 fl    MPV 10.6 8.0 - 12.0 fL    Platelets 142 (L) 150 - 450 10*3/mm3    Neutrophil % 48.3 37.0 - 80.0 %    Lymphocyte % 41.6 10.0 - 50.0 %    Monocyte % 7.8 0.0 - 12.0 %    Eosinophil % 2.0 0.0 - 7.0 %    Basophil % 0.3 0.0 - 2.0 %    Immature Grans % 0.0 0.0 - 0.5 %    Neutrophils, Absolute 1.67 (L) 2.00 - 8.60 10*3/mm3    Lymphocytes, Absolute 1.44 0.60 - 4.20 10*3/mm3    Monocytes, Absolute 0.27 0.00 - 0.90 10*3/mm3    Eosinophils, Absolute 0.07 0.00 - 0.70 10*3/mm3    Basophils, Absolute 0.01 0.00 - 0.20 10*3/mm3    Immature Grans, Absolute 0.00 0.00 - 0.02 10*3/mm3   CBC Auto Differential   Result Value Ref Range    WBC 3.37 3.20 - 9.80 10*3/mm3    RBC 3.51 (L) 4.37 - 5.74 10*6/mm3    Hemoglobin 10.8 (L) 13.7 - 17.3 g/dL    Hematocrit 31.6 (L) 39.0 - 49.0 %    MCV 90.0 80.0 - 98.0 fL    MCH 30.8 26.5 - 34.0 pg    MCHC 34.2 31.5 - 36.3 g/dL    RDW 14.4 11.5 - 14.5 %    RDW-SD 47.6 (H) 35.1 - 43.9 fl    MPV 10.9 8.0 - 12.0 fL    Platelets 148 (L) 150 - 450 10*3/mm3    Neutrophil % 46.3 37.0 - 80.0 %    Lymphocyte % 43.0 10.0 - 50.0 %    Monocyte % 8.3 0.0 - 12.0 %    Eosinophil % 1.8 0.0 - 7.0 %    Basophil % 0.6 0.0 - 2.0 %    Immature Grans % 0.0 0.0 - 0.5 %    Neutrophils, Absolute 1.56 (L) 2.00 - 8.60 10*3/mm3     Lymphocytes, Absolute 1.45 0.60 - 4.20 10*3/mm3    Monocytes, Absolute 0.28 0.00 - 0.90 10*3/mm3    Eosinophils, Absolute 0.06 0.00 - 0.70 10*3/mm3    Basophils, Absolute 0.02 0.00 - 0.20 10*3/mm3    Immature Grans, Absolute 0.00 0.00 - 0.02 10*3/mm3     *Note: Due to a large number of results and/or encounters for the requested time period, some results have not been displayed. A complete set of results can be found in Results Review.       Some portions of this note have been dictated using voice recognition software and may contain errors and/or omissions.

## 2018-02-21 ENCOUNTER — TELEPHONE (OUTPATIENT)
Dept: GASTROENTEROLOGY | Facility: CLINIC | Age: 38
End: 2018-02-21

## 2018-02-21 DIAGNOSIS — K70.31 ALCOHOLIC CIRRHOSIS OF LIVER WITH ASCITES (HCC): Primary | ICD-10-CM

## 2018-02-21 DIAGNOSIS — R11.0 NAUSEA: ICD-10-CM

## 2018-02-21 DIAGNOSIS — K70.11 ASCITES DUE TO ALCOHOLIC HEPATITIS: ICD-10-CM

## 2018-02-21 DIAGNOSIS — R10.10 PAIN OF UPPER ABDOMEN: ICD-10-CM

## 2018-02-21 NOTE — TELEPHONE ENCOUNTER
A voicemail has been left for the patient to make him aware that his ultrasound has been scheduled for March 1st at 9:00. Instructions for the patient to be NPO after midnight and to register at same day surgery were also left via voicemail.  Encouraged patient via voicemail to call our office back if he has any questions.

## 2018-02-26 LAB
ALBUMIN SERPL-MCNC: 4.2 G/DL (ref 3.2–5.5)
ALBUMIN/GLOB SERPL: 1 G/DL (ref 1–3)
ALP SERPL-CCNC: 116 U/L (ref 15–121)
ALT SERPL W P-5'-P-CCNC: 27 U/L (ref 10–60)
AMMONIA BLD-SCNC: 24 UMOL/L (ref 9–30)
ANION GAP SERPL CALCULATED.3IONS-SCNC: 11 MMOL/L (ref 5–15)
AST SERPL-CCNC: 35 U/L (ref 10–60)
BASOPHILS # BLD AUTO: 0.01 10*3/MM3 (ref 0–0.2)
BASOPHILS NFR BLD AUTO: 0.2 % (ref 0–2)
BILIRUB SERPL-MCNC: 1.1 MG/DL (ref 0.2–1)
BUN BLD-MCNC: 28 MG/DL (ref 8–25)
BUN/CREAT SERPL: 25.5 (ref 7–25)
CALCIUM SPEC-SCNC: 10.6 MG/DL (ref 8.4–10.8)
CHLORIDE SERPL-SCNC: 99 MMOL/L (ref 100–112)
CO2 SERPL-SCNC: 24 MMOL/L (ref 20–32)
CREAT BLD-MCNC: 1.1 MG/DL (ref 0.4–1.3)
DEPRECATED RDW RBC AUTO: 45.9 FL (ref 35.1–43.9)
EOSINOPHIL # BLD AUTO: 0.09 10*3/MM3 (ref 0–0.7)
EOSINOPHIL NFR BLD AUTO: 2 % (ref 0–7)
ERYTHROCYTE [DISTWIDTH] IN BLOOD BY AUTOMATED COUNT: 14.4 % (ref 11.5–14.5)
FERRITIN SERPL-MCNC: 118 NG/ML (ref 17.9–464)
GFR SERPL CREATININE-BSD FRML MDRD: 75 ML/MIN/1.73 (ref 70–162)
GLOBULIN UR ELPH-MCNC: 4.1 GM/DL (ref 2.5–4.6)
GLUCOSE BLD-MCNC: 117 MG/DL (ref 70–100)
HCT VFR BLD AUTO: 38.9 % (ref 39–49)
HGB BLD-MCNC: 13.6 G/DL (ref 13.7–17.3)
INR PPP: 1.14 (ref 0.8–1.2)
IRON 24H UR-MRATE: 123 MCG/DL (ref 49–181)
IRON SATN MFR SERPL: 33 % (ref 20–55)
LYMPHOCYTES # BLD AUTO: 1.45 10*3/MM3 (ref 0.6–4.2)
LYMPHOCYTES NFR BLD AUTO: 33 % (ref 10–50)
MCH RBC QN AUTO: 30.9 PG (ref 26.5–34)
MCHC RBC AUTO-ENTMCNC: 35 G/DL (ref 31.5–36.3)
MCV RBC AUTO: 88.4 FL (ref 80–98)
MONOCYTES # BLD AUTO: 0.34 10*3/MM3 (ref 0–0.9)
MONOCYTES NFR BLD AUTO: 7.7 % (ref 0–12)
NEUTROPHILS # BLD AUTO: 2.51 10*3/MM3 (ref 2–8.6)
NEUTROPHILS NFR BLD AUTO: 57.1 % (ref 37–80)
PLATELET # BLD AUTO: 192 10*3/MM3 (ref 150–450)
PMV BLD AUTO: 10.7 FL (ref 8–12)
POTASSIUM BLD-SCNC: 4.7 MMOL/L (ref 3.4–5.4)
PROT SERPL-MCNC: 8.3 G/DL (ref 6.7–8.2)
PROTHROMBIN TIME: 14.5 SECONDS (ref 11.1–15.3)
RBC # BLD AUTO: 4.4 10*6/MM3 (ref 4.37–5.74)
SODIUM BLD-SCNC: 134 MMOL/L (ref 134–146)
TIBC SERPL-MCNC: 375 MCG/DL (ref 261–462)
WBC NRBC COR # BLD: 4.4 10*3/MM3 (ref 3.2–9.8)

## 2018-02-26 PROCEDURE — 82105 ALPHA-FETOPROTEIN SERUM: CPT | Performed by: PHYSICIAN ASSISTANT

## 2018-02-26 PROCEDURE — 85025 COMPLETE CBC W/AUTO DIFF WBC: CPT | Performed by: INTERNAL MEDICINE

## 2018-02-26 PROCEDURE — 83540 ASSAY OF IRON: CPT | Performed by: PHYSICIAN ASSISTANT

## 2018-02-26 PROCEDURE — 83550 IRON BINDING TEST: CPT | Performed by: PHYSICIAN ASSISTANT

## 2018-02-26 PROCEDURE — 82103 ALPHA-1-ANTITRYPSIN TOTAL: CPT | Performed by: PHYSICIAN ASSISTANT

## 2018-02-26 PROCEDURE — 36415 COLL VENOUS BLD VENIPUNCTURE: CPT | Performed by: INTERNAL MEDICINE

## 2018-02-26 PROCEDURE — 85610 PROTHROMBIN TIME: CPT | Performed by: INTERNAL MEDICINE

## 2018-02-26 PROCEDURE — 82140 ASSAY OF AMMONIA: CPT | Performed by: PHYSICIAN ASSISTANT

## 2018-02-26 PROCEDURE — 82390 ASSAY OF CERULOPLASMIN: CPT | Performed by: PHYSICIAN ASSISTANT

## 2018-02-26 PROCEDURE — 80053 COMPREHEN METABOLIC PANEL: CPT | Performed by: INTERNAL MEDICINE

## 2018-02-26 PROCEDURE — 83516 IMMUNOASSAY NONANTIBODY: CPT | Performed by: PHYSICIAN ASSISTANT

## 2018-02-26 PROCEDURE — 80074 ACUTE HEPATITIS PANEL: CPT | Performed by: PHYSICIAN ASSISTANT

## 2018-02-26 PROCEDURE — 82728 ASSAY OF FERRITIN: CPT | Performed by: PHYSICIAN ASSISTANT

## 2018-02-26 PROCEDURE — 86038 ANTINUCLEAR ANTIBODIES: CPT | Performed by: PHYSICIAN ASSISTANT

## 2018-02-28 LAB
A1AT SERPL-MCNC: 242 MG/DL (ref 90–200)
ACTIN IGG SERPL-ACNC: 11 UNITS (ref 0–19)
AFP-TM SERPL-MCNC: 3.5 NG/ML (ref 0–8.3)
ANA SER QL IA: NEGATIVE
CERULOPLASMIN SERPL-MCNC: 29.9 MG/DL (ref 16–31)
DEPRECATED MITOCHONDRIA M2 IGG SER-ACNC: <20 UNITS (ref 0–20)
HAV IGM SERPL QL IA: NEGATIVE
HBV CORE IGM SERPL QL IA: NEGATIVE
HBV SURFACE AG SERPL QL IA: NEGATIVE
HCV AB SER DONR QL: NEGATIVE

## 2018-03-01 ENCOUNTER — HOSPITAL ENCOUNTER (OUTPATIENT)
Dept: ULTRASOUND IMAGING | Facility: HOSPITAL | Age: 38
Discharge: HOME | End: 2018-03-01

## 2018-03-01 ENCOUNTER — HOSPITAL ENCOUNTER (OUTPATIENT)
Dept: ULTRASOUND IMAGING | Facility: HOSPITAL | Age: 38
Discharge: HOME | End: 2018-03-01
Admitting: PHYSICIAN ASSISTANT

## 2018-03-01 PROCEDURE — 93976 VASCULAR STUDY: CPT

## 2018-03-01 PROCEDURE — 76700 US EXAM ABDOM COMPLETE: CPT

## 2018-03-06 ENCOUNTER — OFFICE VISIT (OUTPATIENT)
Dept: GASTROENTEROLOGY | Facility: CLINIC | Age: 38
End: 2018-03-06

## 2018-03-06 VITALS
SYSTOLIC BLOOD PRESSURE: 104 MMHG | BODY MASS INDEX: 21.94 KG/M2 | DIASTOLIC BLOOD PRESSURE: 70 MMHG | WEIGHT: 162 LBS | HEART RATE: 109 BPM | HEIGHT: 72 IN

## 2018-03-06 DIAGNOSIS — K70.31 ALCOHOLIC CIRRHOSIS OF LIVER WITH ASCITES (HCC): Primary | ICD-10-CM

## 2018-03-06 DIAGNOSIS — K76.0 FATTY (CHANGE OF) LIVER, NOT ELSEWHERE CLASSIFIED: ICD-10-CM

## 2018-03-06 DIAGNOSIS — E71.30 DISORDER OF FATTY-ACID METABOLISM: ICD-10-CM

## 2018-03-06 DIAGNOSIS — K70.11 ASCITES DUE TO ALCOHOLIC HEPATITIS: ICD-10-CM

## 2018-03-06 DIAGNOSIS — R10.10 PAIN OF UPPER ABDOMEN: ICD-10-CM

## 2018-03-06 DIAGNOSIS — R11.2 NON-INTRACTABLE VOMITING WITH NAUSEA, UNSPECIFIED VOMITING TYPE: ICD-10-CM

## 2018-03-06 PROCEDURE — 99214 OFFICE O/P EST MOD 30 MIN: CPT | Performed by: PHYSICIAN ASSISTANT

## 2018-03-06 RX ORDER — DEXTROSE AND SODIUM CHLORIDE 5; .45 G/100ML; G/100ML
30 INJECTION, SOLUTION INTRAVENOUS CONTINUOUS PRN
Status: CANCELLED | OUTPATIENT
Start: 2018-03-15

## 2018-03-06 RX ORDER — PROMETHAZINE HYDROCHLORIDE 12.5 MG/1
12.5 TABLET ORAL EVERY 6 HOURS PRN
Qty: 30 TABLET | Refills: 1 | Status: SHIPPED | OUTPATIENT
Start: 2018-03-06 | End: 2018-03-27 | Stop reason: SDUPTHER

## 2018-03-06 NOTE — PROGRESS NOTES
Chief Complaint   Patient presents with   • Alcoholic Cirrhosis   • Abdominal Pain   • Ascites   • Nausea   • Vomiting       ENDO PROCEDURE ORDERED: EGD eval varices, N/V    Subjective    Jung Cadet is a 37 y.o. male. he is here today for follow-up.    History of Present Illness    Patient seen on a recheck of his alcoholic cirrhosis, nausea, vomiting, abdominal pain, ascites. Last seen 02/20/2018. Patient presents with 8 out of 10 abdominal pain. Patient states he has had nausea and vomiting all day, no fever or chills. His bowel movements have been regular without blood or mucus. Weight is down 2 pounds since last visit. He has never had a colonoscopy. He is still taking Lasix and Aldactone for his ascites. He is on lactulose for his bowel movements.     Laboratory on 02/26/2018 showed normal ammonia, negative hepatitis diagnostic panel, normal or negative CECY, AMA, SMA, ceruloplasmin, AFP, iron studies. INR 1.14. alpha-1 antitrypsin was increased at 242. CMP showed glucose 117, BUN 28, chloride 99, protein 8.3, total bilirubin 1.1, otherwise normal. CBC showed hemoglobin 13.6, hematocrit 38.9, platelets 192,000.     Abdominal ultrasound on 03/01/2018 showed a prominent liver, fatty liver, normal gallbladder, 4 mm common bile duct, enlarged spleen 17.2 cm, possible portal hypertension.     ASSESSMENT/PLAN: Patient is moderately tender epigastricly with nausea, vomiting, abdominal pain, possible infectious etiology. He requested and I refilled Phenergan, but I gave him 12.5 mg. He should avoid sedating medications. I asked him to hold his diuretics if he is vomiting to avoid dehydration. I suggested we go ahead and schedule him for an EGD. He could have an ulcer, may have portal hypertensive gastropathy. Will check alpha-1 antitrypsin phenotype, CMP, CBC, INR, GHOTRA FibroSure. We will see him in followup after the above, further pending clinical course and the results of the above.       The following portions of  the patient's history were reviewed and updated as appropriate:   Past Medical History:   Diagnosis Date   • Abdominal pain    • Acute renal failure syndrome    • Backache      Chronic      • Bronchitis    • Chronic pain    • Cirrhosis    • Degenerative joint disease involving multiple joints    • Drug abuse     History of opiate withdrawal      • Fracture of multiple bones of upper limb    • Generalized anxiety disorder    • Hypercalcemia    • Insomnia    • Multiple fractures of lower leg     6 places      • Open fracture of humerus    • Open fracture of humerus    • Plantar fasciitis     Versus meuropathic or fibromyalgia type pain in feet      • Renal disorder    • Tobacco dependence syndrome    • Verruca vulgaris      Past Surgical History:   Procedure Laterality Date   • ADENOIDECTOMY  01/27/1983    With bilateral tube implants   • ENDOSCOPY  11/30/2011    Normal esophagus. Gastritis in stomach. Biopsy taken. Normal duodenum. Biopsy taken.   • FIXATION DEVICE APPLICATION      Rods in R leg and Lt. arm   • MANDIBLE FRACTURE SURGERY      Jaw wired   • ORTHOPEDIC SURGERY Left     pt states titanium plates in L arm and R leg   • UPPER GASTROINTESTINAL ENDOSCOPY  11/30/2011     Family History   Problem Relation Age of Onset   • Heart attack Mother    • Heart disease Other        No Known Allergies  Social History     Social History   • Marital status: Single     Social History Main Topics   • Smoking status: Current Every Day Smoker     Packs/day: 1.00     Types: Cigarettes   • Smokeless tobacco: Never Used   • Alcohol use No   • Drug use: No   • Sexual activity: Defer       Current Outpatient Prescriptions:   •  furosemide (LASIX) 20 MG tablet, Take 40 mg by mouth 2 (Two) Times a Day., Disp: , Rfl:   •  lactulose (CHRONULAC) 10 GM/15ML solution, Take 20 g by mouth 2 (Two) Times a Day., Disp: , Rfl:   •  spironolactone (ALDACTONE) 25 MG tablet, Take 100 mg by mouth Daily., Disp: , Rfl:   •  promethazine (PHENERGAN)  "12.5 MG tablet, Take 1 tablet by mouth Every 6 (Six) Hours As Needed for Nausea or Vomiting., Disp: 30 tablet, Rfl: 1  Review of Systems  Review of Systems       Objective    /70 (BP Location: Left arm)  Pulse 109  Ht 182.9 cm (72.01\")  Wt 73.5 kg (162 lb)  BMI 21.97 kg/m2  Physical Exam   Constitutional: He is oriented to person, place, and time. He appears well-developed and well-nourished. No distress.   HENT:   Head: Normocephalic and atraumatic.   Eyes: EOM are normal. Pupils are equal, round, and reactive to light.   Neck: Normal range of motion.   Cardiovascular: Normal rate, regular rhythm and normal heart sounds.    Pulmonary/Chest: Effort normal and breath sounds normal.   Abdominal: Soft. Bowel sounds are normal. He exhibits distension. He exhibits no shifting dullness, no abdominal bruit, no ascites and no mass. There is no hepatosplenomegaly. There is no tenderness. There is no rigidity, no rebound, no guarding and no CVA tenderness. No hernia. Hernia confirmed negative in the ventral area.   Mild diffuse   Musculoskeletal: Normal range of motion.   Neurological: He is alert and oriented to person, place, and time.   Skin: Skin is warm and dry.   Psychiatric: He has a normal mood and affect. His behavior is normal. Judgment and thought content normal.   Nursing note and vitals reviewed.    Assessment/Plan      1. Alcoholic cirrhosis of liver with ascites    2. Pain of upper abdomen    3. Ascites due to alcoholic hepatitis    4. Non-intractable vomiting with nausea, unspecified vomiting type    5. Fatty (change of) liver, not elsewhere classified     6. Disorder of fatty-acid metabolism     .   Jung was seen today for alcoholic cirrhosis, abdominal pain, ascites, nausea and vomiting.    Diagnoses and all orders for this visit:    Alcoholic cirrhosis of liver with ascites  -     Case Request; Standing  -     dextrose 5 % and sodium chloride 0.45 % infusion; Infuse 30 mL/hr into a venous " catheter Continuous As Needed (Start Prior to Procedure).  -     Case Request  -     Alpha - 1 - Antitrypsin Phenotype  -     CBC Auto Differential  -     Comprehensive Metabolic Panel  -     HGOTRA Fibrosure  -     Protime-INR    Pain of upper abdomen  -     Case Request; Standing  -     dextrose 5 % and sodium chloride 0.45 % infusion; Infuse 30 mL/hr into a venous catheter Continuous As Needed (Start Prior to Procedure).  -     Case Request  -     Alpha - 1 - Antitrypsin Phenotype  -     CBC Auto Differential  -     Comprehensive Metabolic Panel  -     GHOTRA Fibrosure  -     Protime-INR    Ascites due to alcoholic hepatitis  -     Case Request; Standing  -     dextrose 5 % and sodium chloride 0.45 % infusion; Infuse 30 mL/hr into a venous catheter Continuous As Needed (Start Prior to Procedure).  -     Case Request  -     Alpha - 1 - Antitrypsin Phenotype  -     CBC Auto Differential  -     Comprehensive Metabolic Panel  -     GHOTRA Fibrosure  -     Protime-INR    Non-intractable vomiting with nausea, unspecified vomiting type  -     Case Request; Standing  -     dextrose 5 % and sodium chloride 0.45 % infusion; Infuse 30 mL/hr into a venous catheter Continuous As Needed (Start Prior to Procedure).  -     Case Request  -     Alpha - 1 - Antitrypsin Phenotype  -     CBC Auto Differential  -     Comprehensive Metabolic Panel  -     GHOTRA Fibrosure  -     Protime-INR    Fatty (change of) liver, not elsewhere classified   -     GHOTRA Fibrosure    Disorder of fatty-acid metabolism   -     GHOTRA Fibrosure    Other orders  -     promethazine (PHENERGAN) 12.5 MG tablet; Take 1 tablet by mouth Every 6 (Six) Hours As Needed for Nausea or Vomiting.  -     Obtain Informed Consent; Standing  -     POC Glucose Once; Standing        Orders placed during this encounter include:  Orders Placed This Encounter   Procedures   • Alpha - 1 - Antitrypsin Phenotype     Prior to follow up   • CBC Auto Differential     Prior to follow up   •  Comprehensive Metabolic Panel     Prior to follow up   • GHOTRA Fibrosure     Prior to follow up   • Protime-INR     Prior to follow up       Medications prescribed:  New Medications Ordered This Visit   Medications   • promethazine (PHENERGAN) 12.5 MG tablet     Sig: Take 1 tablet by mouth Every 6 (Six) Hours As Needed for Nausea or Vomiting.     Dispense:  30 tablet     Refill:  1       Requested Prescriptions     Signed Prescriptions Disp Refills   • promethazine (PHENERGAN) 12.5 MG tablet 30 tablet 1     Sig: Take 1 tablet by mouth Every 6 (Six) Hours As Needed for Nausea or Vomiting.       Review and/or summary of lab tests, radiology, procedures, medications. Review and summary of old records and obtaining of history. The risks and benefits of my recommendations, as well as other treatment options were discussed with the patient today. Questions were answered.    Follow-up: Return in about 6 weeks (around 4/17/2018), or if symptoms worsen or fail to improve.     ESOPHAGOGASTRODUODENOSCOPY--eval varices (N/A)      This document has been electronically signed by Asim Easley PA-C on March 8, 2018 5:07 PM      Results for orders placed or performed in visit on 02/20/18   CBC Auto Differential   Result Value Ref Range    WBC 4.40 3.20 - 9.80 10*3/mm3    RBC 4.40 4.37 - 5.74 10*6/mm3    Hemoglobin 13.6 (L) 13.7 - 17.3 g/dL    Hematocrit 38.9 (L) 39.0 - 49.0 %    MCV 88.4 80.0 - 98.0 fL    MCH 30.9 26.5 - 34.0 pg    MCHC 35.0 31.5 - 36.3 g/dL    RDW 14.4 11.5 - 14.5 %    RDW-SD 45.9 (H) 35.1 - 43.9 fl    MPV 10.7 8.0 - 12.0 fL    Platelets 192 150 - 450 10*3/mm3    Neutrophil % 57.1 37.0 - 80.0 %    Lymphocyte % 33.0 10.0 - 50.0 %    Monocyte % 7.7 0.0 - 12.0 %    Eosinophil % 2.0 0.0 - 7.0 %    Basophil % 0.2 0.0 - 2.0 %    Neutrophils, Absolute 2.51 2.00 - 8.60 10*3/mm3    Lymphocytes, Absolute 1.45 0.60 - 4.20 10*3/mm3    Monocytes, Absolute 0.34 0.00 - 0.90 10*3/mm3    Eosinophils, Absolute 0.09 0.00 - 0.70  10*3/mm3    Basophils, Absolute 0.01 0.00 - 0.20 10*3/mm3   Nuclear Antigen Antibody, IFA   Result Value Ref Range    CECY Negative    Iron and TIBC   Result Value Ref Range    Iron 123 49 - 181 mcg/dL    TIBC 375 261 - 462 mcg/dL    Iron Saturation 33 20 - 55 %   Alpha - 1 - Antitrypsin   Result Value Ref Range    A-1 Antitrypsin 242 (H) 90 - 200 mg/dL   Mitochondrial Antibodies, M2   Result Value Ref Range    Mitochondrial Ab <20.0 0.0 - 20.0 Units   Ceruloplasmin   Result Value Ref Range    Ceruloplasmin 29.9 16.0 - 31.0 mg/dL   AFP Tumor Marker   Result Value Ref Range    AFP Tumor Marker 3.5 0.0 - 8.3 ng/mL   Hepatitis Panel, Acute   Result Value Ref Range    Hepatitis C Ab Negative Negative    Hep A IgM Negative Negative    Hep B C IgM Negative Negative    Hepatitis B Surface Ag Negative Negative   Anti-Smooth Muscle Antibody Titer   Result Value Ref Range    Smooth Muscle Ab 11 0 - 19 Units   Protime-INR   Result Value Ref Range    Protime 14.5 11.1 - 15.3 Seconds    INR 1.14 0.80 - 1.20   Ferritin   Result Value Ref Range    Ferritin 118.00 17.90 - 464.00 ng/mL   Ammonia   Result Value Ref Range    Ammonia 24 9 - 30 umol/L   Comprehensive Metabolic Panel   Result Value Ref Range    Glucose 117 (H) 70 - 100 mg/dL    BUN 28 (H) 8 - 25 mg/dL    Creatinine 1.10 0.40 - 1.30 mg/dL    Sodium 134 134 - 146 mmol/L    Potassium 4.7 3.4 - 5.4 mmol/L    Chloride 99 (L) 100 - 112 mmol/L    CO2 24.0 20.0 - 32.0 mmol/L    Calcium 10.6 8.4 - 10.8 mg/dL    Total Protein 8.3 (H) 6.7 - 8.2 g/dL    Albumin 4.20 3.20 - 5.50 g/dL    ALT (SGPT) 27 10 - 60 U/L    AST (SGOT) 35 10 - 60 U/L    Alkaline Phosphatase 116 15 - 121 U/L    Total Bilirubin 1.1 (H) 0.2 - 1.0 mg/dL    eGFR Non  Amer 75 70 - 162 mL/min/1.73    Globulin 4.1 2.5 - 4.6 gm/dL    A/G Ratio 1.0 1.0 - 3.0 g/dL    BUN/Creatinine Ratio 25.5 (H) 7.0 - 25.0    Anion Gap 11.0 5.0 - 15.0 mmol/L   Results for orders placed or performed during the hospital encounter of  01/12/18   [facility]   Result Value Ref Range    Extra Tube Hold for add-ons.    Urinalysis With / Culture If Indicated - Urine, Clean Catch   Result Value Ref Range    Color, UA Yellow Yellow, Straw, Dark Yellow, Steph    Appearance, UA Clear Clear    pH, UA 6.0 5.0 - 9.0    Specific Gravity, UA 1.017 1.003 - 1.030    Glucose, UA Negative Negative    Ketones, UA Negative Negative    Bilirubin, UA Negative Negative    Blood, UA Negative Negative    Protein, UA Negative Negative    Leuk Esterase, UA Negative Negative    Nitrite, UA Negative Negative    Urobilinogen, UA 1.0 E.U./dL 0.2 - 1.0 E.U./dL   CBC Auto Differential   Result Value Ref Range    WBC 3.79 3.20 - 9.80 10*3/mm3    RBC 3.63 (L) 4.37 - 5.74 10*6/mm3    Hemoglobin 11.1 (L) 13.7 - 17.3 g/dL    Hematocrit 32.3 (L) 39.0 - 49.0 %    MCV 89.0 80.0 - 98.0 fL    MCH 30.6 26.5 - 34.0 pg    MCHC 34.4 31.5 - 36.3 g/dL    RDW 14.3 11.5 - 14.5 %    RDW-SD 47.0 (H) 35.1 - 43.9 fl    MPV 10.8 8.0 - 12.0 fL    Platelets 148 (L) 150 - 450 10*3/mm3    Neutrophil % 47.7 37.0 - 80.0 %    Lymphocyte % 38.0 10.0 - 50.0 %    Monocyte % 10.8 0.0 - 12.0 %    Eosinophil % 2.9 0.0 - 7.0 %    Basophil % 0.3 0.0 - 2.0 %    Immature Grans % 0.3 0.0 - 0.5 %    Neutrophils, Absolute 1.81 (L) 2.00 - 8.60 10*3/mm3    Lymphocytes, Absolute 1.44 0.60 - 4.20 10*3/mm3    Monocytes, Absolute 0.41 0.00 - 0.90 10*3/mm3    Eosinophils, Absolute 0.11 0.00 - 0.70 10*3/mm3    Basophils, Absolute 0.01 0.00 - 0.20 10*3/mm3    Immature Grans, Absolute 0.01 0.00 - 0.02 10*3/mm3   CBC Auto Differential   Result Value Ref Range    WBC 3.28 3.20 - 9.80 10*3/mm3    RBC 3.35 (L) 4.37 - 5.74 10*6/mm3    Hemoglobin 10.5 (L) 13.7 - 17.3 g/dL    Hematocrit 29.9 (L) 39.0 - 49.0 %    MCV 89.3 80.0 - 98.0 fL    MCH 31.3 26.5 - 34.0 pg    MCHC 35.1 31.5 - 36.3 g/dL    RDW 14.3 11.5 - 14.5 %    RDW-SD 47.1 (H) 35.1 - 43.9 fl    MPV 10.4 8.0 - 12.0 fL    Platelets 128 (L) 150 - 450 10*3/mm3    Neutrophil %  38.7 37.0 - 80.0 %    Lymphocyte % 49.1 10.0 - 50.0 %    Monocyte % 9.5 0.0 - 12.0 %    Eosinophil % 1.8 0.0 - 7.0 %    Basophil % 0.6 0.0 - 2.0 %    Immature Grans % 0.3 0.0 - 0.5 %    Neutrophils, Absolute 1.27 (L) 2.00 - 8.60 10*3/mm3    Lymphocytes, Absolute 1.61 0.60 - 4.20 10*3/mm3    Monocytes, Absolute 0.31 0.00 - 0.90 10*3/mm3    Eosinophils, Absolute 0.06 0.00 - 0.70 10*3/mm3    Basophils, Absolute 0.02 0.00 - 0.20 10*3/mm3    Immature Grans, Absolute 0.01 0.00 - 0.02 10*3/mm3     *Note: Due to a large number of results and/or encounters for the requested time period, some results have not been displayed. A complete set of results can be found in Results Review.       Some portions of this note have been dictated using voice recognition software and may contain errors and/or omissions.

## 2018-03-07 ENCOUNTER — TELEPHONE (OUTPATIENT)
Dept: GASTROENTEROLOGY | Facility: CLINIC | Age: 38
End: 2018-03-07

## 2018-03-07 PROBLEM — R11.2 NON-INTRACTABLE VOMITING WITH NAUSEA: Status: ACTIVE | Noted: 2018-03-07

## 2018-03-07 PROBLEM — R10.10 PAIN OF UPPER ABDOMEN: Status: ACTIVE | Noted: 2018-03-07

## 2018-03-07 PROBLEM — K70.11 ASCITES DUE TO ALCOHOLIC HEPATITIS: Status: ACTIVE | Noted: 2018-03-07

## 2018-03-07 NOTE — TELEPHONE ENCOUNTER
A voicemail has been left for the patient to make him aware that his EGD has been scheduled for March 15 at 12:45. Clear liquids until 8:00 NPO after that. Encouraged patient via voicemail to call our office back if he has any questions.

## 2018-03-07 NOTE — TELEPHONE ENCOUNTER
----- Message from Daysi Tanner sent at 3/7/2018  8:32 AM CST -----  Thursday March 15th arrive @2356    ----- Message -----     From: Eli Orosco     Sent: 3/6/2018   4:56 PM       To: Daysi Tanner    Please schedule patient for egd urgent per lois mar. Patient has f/u on April 17th.

## 2018-03-19 RX ORDER — SPIRONOLACTONE 25 MG/1
TABLET ORAL
Qty: 120 TABLET | Refills: 0 | Status: SHIPPED | OUTPATIENT
Start: 2018-03-19

## 2018-03-19 RX ORDER — FUROSEMIDE 20 MG/1
TABLET ORAL
Qty: 120 TABLET | Refills: 0 | Status: SHIPPED | OUTPATIENT
Start: 2018-03-19

## 2018-03-27 RX ORDER — PROMETHAZINE HYDROCHLORIDE 12.5 MG/1
12.5 TABLET ORAL EVERY 6 HOURS PRN
Qty: 30 TABLET | Refills: 0 | Status: SHIPPED | OUTPATIENT
Start: 2018-03-27 | End: 2018-04-23 | Stop reason: SDUPTHER

## 2018-04-23 RX ORDER — PROMETHAZINE HYDROCHLORIDE 12.5 MG/1
12.5 TABLET ORAL EVERY 6 HOURS PRN
Qty: 30 TABLET | Refills: 0 | Status: SHIPPED | OUTPATIENT
Start: 2018-04-23

## 2018-05-02 ENCOUNTER — HOSPITAL ENCOUNTER (OUTPATIENT)
Facility: HOSPITAL | Age: 38
Setting detail: HOSPITAL OUTPATIENT SURGERY
End: 2018-05-02
Attending: INTERNAL MEDICINE | Admitting: INTERNAL MEDICINE

## 2018-05-31 RX ORDER — DEXTROSE AND SODIUM CHLORIDE 5; .45 G/100ML; G/100ML
20 INJECTION, SOLUTION INTRAVENOUS CONTINUOUS
Status: CANCELLED | OUTPATIENT
Start: 2018-06-01
